# Patient Record
Sex: MALE | Race: BLACK OR AFRICAN AMERICAN | NOT HISPANIC OR LATINO | Employment: FULL TIME | URBAN - METROPOLITAN AREA
[De-identification: names, ages, dates, MRNs, and addresses within clinical notes are randomized per-mention and may not be internally consistent; named-entity substitution may affect disease eponyms.]

---

## 2020-01-20 ENCOUNTER — HOSPITAL ENCOUNTER (EMERGENCY)
Facility: CLINIC | Age: 55
Discharge: HOME | End: 2020-01-21
Attending: EMERGENCY MEDICINE | Admitting: EMERGENCY MEDICINE
Payer: COMMERCIAL

## 2020-01-20 VITALS
SYSTOLIC BLOOD PRESSURE: 136 MMHG | TEMPERATURE: 97.8 F | HEART RATE: 87 BPM | DIASTOLIC BLOOD PRESSURE: 85 MMHG | RESPIRATION RATE: 18 BRPM | WEIGHT: 260 LBS | OXYGEN SATURATION: 95 %

## 2020-01-20 DIAGNOSIS — H81.11 BENIGN PAROXYSMAL POSITIONAL VERTIGO OF RIGHT EAR: ICD-10-CM

## 2020-01-20 LAB
ANION GAP SERPL CALCULATED.3IONS-SCNC: 5 MMOL/L (ref 3–14)
BASOPHILS # BLD AUTO: 0 10E9/L (ref 0–0.2)
BASOPHILS NFR BLD AUTO: 0.1 %
BUN SERPL-MCNC: 28 MG/DL (ref 7–30)
CALCIUM SERPL-MCNC: 9.9 MG/DL (ref 8.5–10.1)
CHLORIDE SERPL-SCNC: 103 MMOL/L (ref 94–109)
CO2 SERPL-SCNC: 30 MMOL/L (ref 20–32)
CREAT SERPL-MCNC: 1.37 MG/DL (ref 0.66–1.25)
DIFFERENTIAL METHOD BLD: ABNORMAL
EOSINOPHIL # BLD AUTO: 0.1 10E9/L (ref 0–0.7)
EOSINOPHIL NFR BLD AUTO: 1.6 %
ERYTHROCYTE [DISTWIDTH] IN BLOOD BY AUTOMATED COUNT: 13.6 % (ref 10–15)
GFR SERPL CREATININE-BSD FRML MDRD: 57 ML/MIN/{1.73_M2}
GLUCOSE SERPL-MCNC: 138 MG/DL (ref 70–99)
HCT VFR BLD AUTO: 42.2 % (ref 40–53)
HGB BLD-MCNC: 13.8 G/DL (ref 13.3–17.7)
IMM GRANULOCYTES # BLD: 0 10E9/L (ref 0–0.4)
IMM GRANULOCYTES NFR BLD: 0.1 %
INTERPRETATION ECG - MUSE: NORMAL
LYMPHOCYTES # BLD AUTO: 0.5 10E9/L (ref 0.8–5.3)
LYMPHOCYTES NFR BLD AUTO: 5.9 %
MCH RBC QN AUTO: 28.2 PG (ref 26.5–33)
MCHC RBC AUTO-ENTMCNC: 32.7 G/DL (ref 31.5–36.5)
MCV RBC AUTO: 86 FL (ref 78–100)
MONOCYTES # BLD AUTO: 0.3 10E9/L (ref 0–1.3)
MONOCYTES NFR BLD AUTO: 3.5 %
NEUTROPHILS # BLD AUTO: 7.1 10E9/L (ref 1.6–8.3)
NEUTROPHILS NFR BLD AUTO: 88.8 %
NRBC # BLD AUTO: 0 10*3/UL
NRBC BLD AUTO-RTO: 0 /100
PLATELET # BLD AUTO: 312 10E9/L (ref 150–450)
POTASSIUM SERPL-SCNC: 4 MMOL/L (ref 3.4–5.3)
RBC # BLD AUTO: 4.9 10E12/L (ref 4.4–5.9)
SODIUM SERPL-SCNC: 138 MMOL/L (ref 133–144)
TROPONIN I SERPL-MCNC: <0.015 UG/L (ref 0–0.04)
WBC # BLD AUTO: 8 10E9/L (ref 4–11)

## 2020-01-20 PROCEDURE — 25800030 ZZH RX IP 258 OP 636: Performed by: EMERGENCY MEDICINE

## 2020-01-20 PROCEDURE — 96360 HYDRATION IV INFUSION INIT: CPT

## 2020-01-20 PROCEDURE — 99284 EMERGENCY DEPT VISIT MOD MDM: CPT | Mod: 25

## 2020-01-20 PROCEDURE — 80048 BASIC METABOLIC PNL TOTAL CA: CPT | Performed by: EMERGENCY MEDICINE

## 2020-01-20 PROCEDURE — 84484 ASSAY OF TROPONIN QUANT: CPT | Performed by: EMERGENCY MEDICINE

## 2020-01-20 PROCEDURE — 93005 ELECTROCARDIOGRAM TRACING: CPT

## 2020-01-20 PROCEDURE — 85025 COMPLETE CBC W/AUTO DIFF WBC: CPT | Performed by: EMERGENCY MEDICINE

## 2020-01-20 PROCEDURE — 25000132 ZZH RX MED GY IP 250 OP 250 PS 637: Performed by: EMERGENCY MEDICINE

## 2020-01-20 RX ORDER — SODIUM CHLORIDE 9 MG/ML
1000 INJECTION, SOLUTION INTRAVENOUS CONTINUOUS
Status: DISCONTINUED | OUTPATIENT
Start: 2020-01-20 | End: 2020-01-21 | Stop reason: HOSPADM

## 2020-01-20 RX ORDER — MECLIZINE HYDROCHLORIDE 25 MG/1
50 TABLET ORAL ONCE
Status: COMPLETED | OUTPATIENT
Start: 2020-01-20 | End: 2020-01-20

## 2020-01-20 RX ADMIN — MECLIZINE HYDROCHLORIDE 50 MG: 25 TABLET ORAL at 22:50

## 2020-01-20 RX ADMIN — SODIUM CHLORIDE 1000 ML: 9 INJECTION, SOLUTION INTRAVENOUS at 22:51

## 2020-01-20 ASSESSMENT — ENCOUNTER SYMPTOMS
NUMBNESS: 1
DIAPHORESIS: 1
HEADACHES: 1
DIARRHEA: 1
ABDOMINAL PAIN: 1
DIZZINESS: 1

## 2020-01-20 NOTE — ED AVS SNAPSHOT
Emergency Department  [address] [place] [address]  Phone:  [phone]  Fax:  [phone]                                    Giovanny Forbes Jr.   MRN: [id number]    Department:   Emergency Department   Date of Visit:  1/20/2020           After Visit Summary Signature Page    I have received my discharge instructions, and my questions have been answered. I have discussed any challenges I see with this plan with the nurse or doctor.    ..........................................................................................................................................  Patient/Patient Representative Signature      ..........................................................................................................................................  Patient Representative Print Name and Relationship to Patient    ..................................................               ................................................  Date                                   Time    ..........................................................................................................................................  Reviewed by Signature/Title    ...................................................              ..............................................  Date                                               Time          22EPIC Rev 08/18

## 2020-01-21 RX ORDER — MECLIZINE HYDROCHLORIDE 25 MG/1
25 TABLET ORAL EVERY 6 HOURS PRN
Qty: 15 TABLET | Refills: 0 | Status: SHIPPED | OUTPATIENT
Start: 2020-01-21 | End: 2023-06-07

## 2020-01-21 RX ORDER — ONDANSETRON 4 MG/1
4 TABLET, ORALLY DISINTEGRATING ORAL EVERY 8 HOURS PRN
Qty: 10 TABLET | Refills: 0 | Status: SHIPPED | OUTPATIENT
Start: 2020-01-21 | End: 2020-01-24

## 2020-01-21 NOTE — ED TRIAGE NOTES
Pt reports dizziness since yesterday but more today, noticed numbness/tingling in left hand pointer and thumb. Pt states pain in left shoulder and under left rib cage.

## 2020-01-21 NOTE — ED NOTES
"Pt tolerated road test well.  Hustle steady, denies dizziness.  Does report feeling a little lightheaded, but states that he \"feels good.\"  MD notified.  Will continue to monitor.  "

## 2020-01-21 NOTE — ED PROVIDER NOTES
History     Chief Complaint:  Dizziness       HPI   Giovanny Forbes is a 54 year old male with a history of hypertension, hyperlipidemia, obesity, and palpitations, who presents for evaluation of room spinning dizziness beginning this evening. Patient reports feeling diaphoretic and dizzy when walking up the stairs this evening, so he went and sat in his bathroom. At that time, he passed a watery stool, but continued to feel dizzy and diaphoretic. He has noted difficulty walking up the stairs or moving around due to the dizziness. Patient's symptoms are worse on the right side. He says he has decreased room spinning dizziness when keeping his eyes closed. Additionally, he reports of tinging in the fingertips of his left hand, left bicep, and on both sides of his head. Here, he continues to endorse dizziness and abdominal discomfort, as well as a pounding sensation in the left side of his head. Of note, patient also reports of a cough and congestion for the past week for which he has been managing with Emergen-C. He has been able to consume water and juice since onset. Denies chest pain or any other concerns. Patient does remark of family history of sudden cardiac death.     Allergies:  NKDA     Medications:    Aspirin 81 mg  Lipitor  Hydrochlorothiazide     Past Medical History:    HTN  Obesity  HLD  Palpitations     Past Surgical History:    Colonoscopy   Hernia repair     Family History:    HTN  Asthma  DM   Prostate cancer  Stroke     Social History:  Smoking status: never  Alcohol use: no   PCP: Lorne Ferrer     Review of Systems   Constitutional: Positive for diaphoresis.   Cardiovascular: Negative for chest pain.   Gastrointestinal: Positive for abdominal pain and diarrhea.   Neurological: Positive for dizziness, numbness (tingling) and headaches.   All other systems reviewed and are negative.        Physical Exam     Patient Vitals for the past 24 hrs:   BP Temp Temp src Pulse Heart Rate Resp SpO2  Weight   01/20/20 2340 136/85 -- -- 87 89 18 95 % --   01/20/20 2121 132/79 97.8  F (36.6  C) Temporal 99 99 20 95 % 117.9 kg (260 lb)        Physical Exam  General: Appears well-developed and well-nourished.   Head: No signs of trauma.   Mouth/Throat: Oropharynx is clear and moist.   Ears:  TMs clear bilaterally  Eyes: Conjunctivae are normal. Pupils are equal, round, and reactive to light.   Neck: Normal range of motion. No nuchal rigidity. No cervical adenopathy  CV: Normal rate and regular rhythm.    Resp: Effort normal and breath sounds normal. No respiratory distress.   GI: Soft. There is no tenderness.  No rebound or guarding.  Normal bowel sounds.    MSK: Normal range of motion. no edema. No Calf tenderness.  Neuro: The patient is alert and oriented to person, place, and time.  PERRLA, EOMI, strength in upper/lower extremities normal and symmetrical. Sensation normal. Speech normal.  CN2-12 intact.  No cerebellar deficits.  Steady gait.  +symptoms with Bushra-Hallpike with right side down.  GCS 15  Skin: Skin is warm and dry. No rash noted.   Psych: normal mood and affect. behavior is normal.       Emergency Department Course     ECG (21:25:03):  Rate 100 bpm. NY interval 168. QRS duration 100. QT/QTc 334/430. P-R-T axes 61 28 -6. Normal sinus rhythm. Minimal criteria for LVH, may be normal variant. Cannot rule out Anteroseptal infarct, age undetermined. Abnormal ECG. Agree with computer interpretation.  Interpreted at 2205 by Paulino Boone MD.     Laboratory:  Troponin 1 (2209): <0.015  CBC: WBC 8.0, HGB 13.8,    BMP: Glucose 138 (H), creatinine 1.37 (H), o/w WNL      Interventions:  2250: meclizine 50 mg PO  2251: NS 1L IV Bolus     Emergency Department Course:  2206: Nursing notes and vitals reviewed. I performed an exam of the patient as documented above.     IV inserted. Medicine administered as documented above. Blood drawn. This was sent to the lab for further testing, results  above.    2350: I rechecked the patient and discussed the results of his workup thus far.     Findings and plan explained to the Patient. Patient discharged home with instructions regarding supportive care, medications, and reasons to return. The importance of close follow-up was reviewed. The Patient was prescribed meclizine and Zofran.     I personally reviewed the laboratory results with the Patient and answered all related questions prior to discharge.         Impression & Plan      Medical Decision Making:  Giovanny Forbes Jr. is a 54-year-old gentleman who presents due to vertigo type symptoms and not feeling well.  Symptoms started after he had gotten up to go upstairs.  He does describe symptoms improving when he closes his eyes or holds his head still and worse when he is moving them.  On my evaluation, he did have a positive Bushra-Hallpike with the right side down.  He was otherwise fully neurologically intact.  He was given meclizine and IV fluids and following this he did have a steady gait and clinically I have low suspicion for cerebellar pathology given the otherwise reassuring exam and history consistent with BPPV.  EKG and blood work and troponin were obtained and troponin was undetectable and pt is not having any chest pain.  Given the improved symptoms, I feel the patient is appropriate for discharge home and outpatient management.  Recommended to follow close with his primary care doctor and instructed to return if he has any worsening symptoms or further concerns.      Diagnosis:    ICD-10-CM    1. Benign paroxysmal positional vertigo of right ear H81.11        Disposition:  discharged to home    Discharge Medications:  New Prescriptions    MECLIZINE (ANTIVERT) 25 MG TABLET    Take 1 tablet (25 mg) by mouth every 6 hours as needed for dizziness    ONDANSETRON (ZOFRAN ODT) 4 MG ODT TAB    Take 1 tablet (4 mg) by mouth every 8 hours as needed for nausea     Marisela MUHAMMAD, am serving as a scribe at  10:06 PM on 1/20/2020 to document services personally performed by Paulino Boone MD based on my observations and the provider's statements to me.    Marisela Javier  1/20/2020    EMERGENCY DEPARTMENT       Paulino Boone MD  01/21/20 0511

## 2023-06-07 ENCOUNTER — OFFICE VISIT (OUTPATIENT)
Dept: INTERNAL MEDICINE | Facility: CLINIC | Age: 58
End: 2023-06-07
Payer: COMMERCIAL

## 2023-06-07 VITALS
RESPIRATION RATE: 18 BRPM | BODY MASS INDEX: 32.48 KG/M2 | TEMPERATURE: 98.6 F | WEIGHT: 253.1 LBS | DIASTOLIC BLOOD PRESSURE: 91 MMHG | OXYGEN SATURATION: 97 % | SYSTOLIC BLOOD PRESSURE: 152 MMHG | HEIGHT: 74 IN | HEART RATE: 70 BPM

## 2023-06-07 DIAGNOSIS — Z00.00 PHYSICAL EXAM, ANNUAL: Primary | ICD-10-CM

## 2023-06-07 DIAGNOSIS — Z12.11 SCREEN FOR COLON CANCER: ICD-10-CM

## 2023-06-07 DIAGNOSIS — I10 PRIMARY HYPERTENSION: ICD-10-CM

## 2023-06-07 DIAGNOSIS — N52.9 ERECTILE DYSFUNCTION, UNSPECIFIED ERECTILE DYSFUNCTION TYPE: ICD-10-CM

## 2023-06-07 DIAGNOSIS — Z13.220 LIPID SCREENING: ICD-10-CM

## 2023-06-07 DIAGNOSIS — Z12.5 SCREENING FOR PROSTATE CANCER: ICD-10-CM

## 2023-06-07 PROCEDURE — 90750 HZV VACC RECOMBINANT IM: CPT | Performed by: INTERNAL MEDICINE

## 2023-06-07 PROCEDURE — 99213 OFFICE O/P EST LOW 20 MIN: CPT | Mod: 25 | Performed by: INTERNAL MEDICINE

## 2023-06-07 PROCEDURE — 99396 PREV VISIT EST AGE 40-64: CPT | Mod: 25 | Performed by: INTERNAL MEDICINE

## 2023-06-07 PROCEDURE — 90471 IMMUNIZATION ADMIN: CPT | Performed by: INTERNAL MEDICINE

## 2023-06-07 RX ORDER — SILDENAFIL 100 MG/1
100 TABLET, FILM COATED ORAL DAILY PRN
Qty: 30 TABLET | Refills: 3 | Status: SHIPPED | OUTPATIENT
Start: 2023-06-07 | End: 2023-06-07

## 2023-06-07 RX ORDER — HYDROCHLOROTHIAZIDE 25 MG/1
25 TABLET ORAL DAILY
Qty: 90 TABLET | Refills: 3 | Status: SHIPPED | OUTPATIENT
Start: 2023-06-07 | End: 2024-06-10

## 2023-06-07 RX ORDER — SILDENAFIL 100 MG/1
100 TABLET, FILM COATED ORAL DAILY PRN
Qty: 30 TABLET | Refills: 3 | Status: SHIPPED | OUTPATIENT
Start: 2023-06-07 | End: 2024-04-30

## 2023-06-07 ASSESSMENT — ENCOUNTER SYMPTOMS
MYALGIAS: 0
WEAKNESS: 0
ARTHRALGIAS: 0
ABDOMINAL PAIN: 0
COUGH: 0
FREQUENCY: 0
HEADACHES: 0
HEMATOCHEZIA: 0
HEARTBURN: 0
DIZZINESS: 0
PALPITATIONS: 0
SORE THROAT: 0
NAUSEA: 0
JOINT SWELLING: 0
HEMATURIA: 0
DIARRHEA: 0
FEVER: 0
NERVOUS/ANXIOUS: 0
DYSURIA: 0
CHILLS: 0
PARESTHESIAS: 0
CONSTIPATION: 0
EYE PAIN: 0
SHORTNESS OF BREATH: 0

## 2023-06-07 NOTE — PROGRESS NOTES
SUBJECTIVE:   CC: Giovanny is an 57 year old who presents for preventative health visit.       6/7/2023     9:42 AM   Additional Questions   Roomed by harjeet   Accompanied by lemuel         6/7/2023     9:42 AM   Patient Reported Additional Medications   Patient reports taking the following new medications none     Healthy Habits:     Getting at least 3 servings of Calcium per day:  Yes    Bi-annual eye exam:  Yes    Dental care twice a year:  Yes    Sleep apnea or symptoms of sleep apnea:  None    Diet:  Other    Frequency of exercise:  2-3 days/week    Duration of exercise:  Greater than 60 minutes    Taking medications regularly:  Yes    Medication side effects:  Not applicable    PHQ-2 Total Score: 0    Additional concerns today:  No                  Today's PHQ-2 Score:       6/7/2023     9:47 AM   PHQ-2 ( 1999 [employer])   Q1: Little interest or pleasure in doing things 0   Q2: Feeling down, depressed or hopeless 0   PHQ-2 Score 0   Q1: Little interest or pleasure in doing things Not at all   Q2: Feeling down, depressed or hopeless Not at all   PHQ-2 Score 0       Have you ever done Advance Care Planning? (For example, a Health Directive, POLST, or a discussion with a medical provider or your loved ones about your wishes): Yes, advance care planning is on file.    Social History     Tobacco Use     Smoking status: Never     Smokeless tobacco: Never   Vaping Use     Vaping status: Not on file   Substance Use Topics     Alcohol use: Not on file             6/7/2023     9:47 AM   Alcohol Use   Prescreen: >3 drinks/day or >7 drinks/week? No       Last PSA: No results found for: PSA    Reviewed orders with patient. Reviewed health maintenance and updated orders accordingly - Yes      Reviewed and updated as needed this visit by clinical staff   Tobacco  Allergies  Meds              Reviewed and updated as needed this visit by Provider                     Review of Systems   Constitutional: Negative for chills and  fever.   HENT: Negative for congestion, ear pain, hearing loss and sore throat.    Eyes: Negative for pain and visual disturbance.   Respiratory: Negative for cough and shortness of breath.    Cardiovascular: Negative for chest pain, palpitations and peripheral edema.   Gastrointestinal: Negative for abdominal pain, constipation, diarrhea, heartburn, hematochezia and nausea.   Genitourinary: Positive for impotence. Negative for dysuria, frequency, genital sores, hematuria, penile discharge and urgency.   Musculoskeletal: Negative for arthralgias, joint swelling and myalgias.   Skin: Negative for rash.   Neurological: Negative for dizziness, weakness, headaches and paresthesias.   Psychiatric/Behavioral: Negative for mood changes. The patient is not nervous/anxious.          OBJECTIVE:   There were no vitals taken for this visit.    Physical Exam  GENERAL: healthy, alert and no distress  EYES: Eyes grossly normal to inspection, PERRL and conjunctivae and sclerae normal  HENT: ear canals and TM's normal, nose and mouth without ulcers or lesions  NECK: no adenopathy, no asymmetry, masses, or scars and thyroid normal to palpation  RESP: lungs clear to auscultation - no rales, rhonchi or wheezes  CV: regular rate and rhythm, normal S1 S2, no S3 or S4, no murmur, click or rub, no peripheral edema and peripheral pulses strong  ABDOMEN: soft, nontender, no hepatosplenomegaly, no masses and bowel sounds normal  MS: no gross musculoskeletal defects noted, no edema  SKIN: no suspicious lesions or rashes  NEURO: Normal strength and tone, mentation intact and speech normal  PSYCH: mentation appears normal, affect normal/bright    Diagnostic Test Results:  Labs reviewed in Epic    ASSESSMENT/PLAN:   (Z00.00) Physical exam, annual  (primary encounter diagnosis)  Comment: normal  Plan:  items reviewed    (I10) Primary hypertension  Comment: off his medication  Plan: Comprehensive metabolic panel,         hydrochlorothiazide  (HYDRODIURIL) 25 MG tablet        Restart hydrochlorothiazide. Nurse visit in 4 weeks. Labs at that time as well.    (N52.9) Erectile dysfunction, unspecified erectile dysfunction type  Comment: ongoing  Plan: sildenafil (VIAGRA) 100 MG tablet,         DISCONTINUED: sildenafil (VIAGRA) 100 MG tablet        Interested in viagra trial.      (Z12.11) Screen for colon cancer  Comment: due  Plan: Colonoscopy Screening  Referral        Colonoscopy ordered. Discussed.    (Z12.5) Screening for prostate cancer  Comment: would like to continue screening  Plan: PSA, screen        ordered    Patient has been advised of split billing requirements and indicates understanding: Yes      COUNSELING:   Reviewed preventive health counseling, as reflected in patient instructions        He reports that he has never smoked. He has never used smokeless tobacco.            Lorne Ferrer MD  [facility]

## 2024-04-30 ENCOUNTER — NURSE TRIAGE (OUTPATIENT)
Dept: NURSING | Facility: CLINIC | Age: 59
End: 2024-04-30

## 2024-04-30 ENCOUNTER — OFFICE VISIT (OUTPATIENT)
Dept: FAMILY MEDICINE | Facility: CLINIC | Age: 59
End: 2024-04-30
Payer: COMMERCIAL

## 2024-04-30 VITALS
BODY MASS INDEX: 33.3 KG/M2 | TEMPERATURE: 98.4 F | SYSTOLIC BLOOD PRESSURE: 138 MMHG | HEIGHT: 75 IN | HEART RATE: 75 BPM | RESPIRATION RATE: 16 BRPM | DIASTOLIC BLOOD PRESSURE: 76 MMHG | OXYGEN SATURATION: 96 % | WEIGHT: 267.8 LBS

## 2024-04-30 DIAGNOSIS — R19.7 DIARRHEA, UNSPECIFIED TYPE: Primary | ICD-10-CM

## 2024-04-30 PROBLEM — E78.5 HYPERLIPIDEMIA, UNSPECIFIED: Status: ACTIVE | Noted: 2017-02-10

## 2024-04-30 PROBLEM — E66.811 OBESITY (BMI 30.0-34.9): Status: ACTIVE | Noted: 2019-03-04

## 2024-04-30 PROBLEM — I10 ESSENTIAL HYPERTENSION: Status: ACTIVE | Noted: 2019-04-01

## 2024-04-30 LAB
ADV 40+41 DNA STL QL NAA+NON-PROBE: NEGATIVE
ASTRO TYP 1-8 RNA STL QL NAA+NON-PROBE: NEGATIVE
BASOPHILS # BLD AUTO: 0 10E3/UL (ref 0–0.2)
BASOPHILS NFR BLD AUTO: 0 %
C CAYETANENSIS DNA STL QL NAA+NON-PROBE: NEGATIVE
CAMPYLOBACTER DNA SPEC NAA+PROBE: NEGATIVE
CRYPTOSP DNA STL QL NAA+NON-PROBE: NEGATIVE
E COLI O157 DNA STL QL NAA+NON-PROBE: NORMAL
E HISTOLYT DNA STL QL NAA+NON-PROBE: NEGATIVE
EAEC ASTA GENE ISLT QL NAA+PROBE: NEGATIVE
EC STX1+STX2 GENES STL QL NAA+NON-PROBE: NEGATIVE
EOSINOPHIL # BLD AUTO: 0.2 10E3/UL (ref 0–0.7)
EOSINOPHIL NFR BLD AUTO: 4 %
EPEC EAE GENE STL QL NAA+NON-PROBE: NEGATIVE
ERYTHROCYTE [DISTWIDTH] IN BLOOD BY AUTOMATED COUNT: 13.6 % (ref 10–15)
ETEC LTA+ST1A+ST1B TOX ST NAA+NON-PROBE: NEGATIVE
G LAMBLIA DNA STL QL NAA+NON-PROBE: NEGATIVE
HCT VFR BLD AUTO: 39.7 % (ref 40–53)
HGB BLD-MCNC: 12.8 G/DL (ref 13.3–17.7)
IMM GRANULOCYTES # BLD: 0 10E3/UL
IMM GRANULOCYTES NFR BLD: 0 %
LYMPHOCYTES # BLD AUTO: 1.3 10E3/UL (ref 0.8–5.3)
LYMPHOCYTES NFR BLD AUTO: 25 %
MCH RBC QN AUTO: 27.5 PG (ref 26.5–33)
MCHC RBC AUTO-ENTMCNC: 32.2 G/DL (ref 31.5–36.5)
MCV RBC AUTO: 85 FL (ref 78–100)
MONOCYTES # BLD AUTO: 0.3 10E3/UL (ref 0–1.3)
MONOCYTES NFR BLD AUTO: 6 %
NEUTROPHILS # BLD AUTO: 3.4 10E3/UL (ref 1.6–8.3)
NEUTROPHILS NFR BLD AUTO: 65 %
NOROVIRUS GI+II RNA STL QL NAA+NON-PROBE: NEGATIVE
P SHIGELLOIDES DNA STL QL NAA+NON-PROBE: NEGATIVE
PLATELET # BLD AUTO: 305 10E3/UL (ref 150–450)
RBC # BLD AUTO: 4.65 10E6/UL (ref 4.4–5.9)
RVA RNA STL QL NAA+NON-PROBE: NEGATIVE
SALMONELLA SP RPOD STL QL NAA+PROBE: NEGATIVE
SAPO I+II+IV+V RNA STL QL NAA+NON-PROBE: NEGATIVE
SHIGELLA SP+EIEC IPAH ST NAA+NON-PROBE: NEGATIVE
V CHOLERAE DNA SPEC QL NAA+PROBE: NEGATIVE
VIBRIO DNA SPEC NAA+PROBE: NEGATIVE
WBC # BLD AUTO: 5.3 10E3/UL (ref 4–11)
Y ENTEROCOL DNA STL QL NAA+PROBE: NEGATIVE

## 2024-04-30 PROCEDURE — 85025 COMPLETE CBC W/AUTO DIFF WBC: CPT | Performed by: NURSE PRACTITIONER

## 2024-04-30 PROCEDURE — 99213 OFFICE O/P EST LOW 20 MIN: CPT | Performed by: NURSE PRACTITIONER

## 2024-04-30 PROCEDURE — 36415 COLL VENOUS BLD VENIPUNCTURE: CPT | Performed by: NURSE PRACTITIONER

## 2024-04-30 PROCEDURE — 80053 COMPREHEN METABOLIC PANEL: CPT | Performed by: NURSE PRACTITIONER

## 2024-04-30 PROCEDURE — 87507 IADNA-DNA/RNA PROBE TQ 12-25: CPT | Performed by: NURSE PRACTITIONER

## 2024-04-30 ASSESSMENT — PAIN SCALES - GENERAL: PAINLEVEL: NO PAIN (0)

## 2024-04-30 NOTE — PROGRESS NOTES
"    Vikram Baltazar is a 58 year old, presenting for the following health issues:  Gastrointestinal Problem (Ate at [employer] and having Diarrhea for 2 days, using [place] Caps, Pepto Bismol.)      4/30/2024    10:30 AM   Additional Questions   Roomed by LION GEORGES     History of Present Illness       Reason for visit:  Loose Stools  Symptom onset:  1-3 days ago  Symptoms include:  Diarrhea  Symptom intensity:  Moderate  Symptom progression:  Staying the same  Had these symptoms before:  No  What makes it worse:  N/a  What makes it better:  Sleep    He eats 0-1 servings of fruits and vegetables daily.He consumes 1 sweetened beverage(s) daily.He exercises with enough effort to increase his heart rate 60 or more minutes per day.  He exercises with enough effort to increase his heart rate 3 or less days per week.        Diarrhea  Onset/Duration:  x 3 days  Description:       Consistency of stool: runny       Blood in stool: No       Number of loose stools past 24 hours: 6-8  Progression of Symptoms: same  Accompanying signs and symptoms:       Fever: No       Nausea/Vomiting: No       Abdominal pain: No       Weight loss: No       Episodes of constipation: No  History   Ill contacts: No  Recent use of antibiotics: No  Recent travels: No  Recent medication-new or changes(Rx or OTC): No  Precipitating or alleviating factors: [facility]   Therapies tried and outcome: OTC supplements, Pepto Bismol     Review of Systems  Constitutional, neuro, ENT, endocrine, pulmonary, cardiac, gastrointestinal, genitourinary, musculoskeletal, integument and psychiatric systems are negative, except as otherwise noted.      Objective    /76 (BP Location: Left arm, Patient Position: Sitting, Cuff Size: Adult Regular)   Pulse 75   Temp 98.4  F (36.9  C) (Oral)   Resp 16   Ht 1.892 m (6' 2.5\")   Wt 121.5 kg (267 lb 12.8 oz)   SpO2 96%   BMI 33.92 kg/m    Body mass index is 33.92 kg/m .  Physical Exam   GENERAL: alert and no " distress  RESP: lungs clear to auscultation - no rales, rhonchi or wheezes  CV: regular rate and rhythm, normal S1 S2, no S3 or S4, no murmur, click or rub, no peripheral edema   ABDOMEN: soft, nontender, no hepatosplenomegaly, no masses and bowel sounds normal  MS: no gross musculoskeletal defects noted, no edema  NEURO: Normal strength and tone, mentation intact and speech normal  PSYCH: mentation appears normal, affect normal/bright    A/P:  1. Diarrhea, unspecified type  - Enteric Bacteria and Virus Panel by MARTIN Stool; Future  - CBC with platelets and differential; Future  - Comprehensive metabolic panel (BMP + Alb, Alk Phos, ALT, AST, Total. Bili, TP); Future  - Enteric Bacteria and Virus Panel by MARTIN Stool  - CBC with platelets and differential  - Comprehensive metabolic panel (BMP + Alb, Alk Phos, ALT, AST, Total. Bili, TP)          Signed Electronically by: Kayli Hamilton, CNP

## 2024-04-30 NOTE — TELEPHONE ENCOUNTER
Triage call  Patient calling to repot that he ate at [employer] on Sunday and he has not been the same since.  He has been having diarrhea about every 2 hours .  He did call the health department.  He has been taking Pepto bismol charcoal tablets .  He has not had a have not had solid stool since Sunday.  He denies any abdominal pain but says it feels heavy.    Per protocol see in office today.  Care advice given.  Verbalizes understanding and agrees with plan.  Transferred to scheduling for a appointment    Aemlia Garcia RN   [facility] Nurse Advisor  8:32 AM 4/30/2024    Reason for Disposition   SEVERE diarrhea (e.g., 7 or more times / day more than normal) and present > 24 hours (1 day)    Additional Information   Negative: Shock suspected (e.g., cold/pale/clammy skin, too weak to stand, low BP, rapid pulse)   Negative: Difficult to awaken or acting confused (e.g., disoriented, slurred speech)   Negative: Sounds like a life-threatening emergency to the triager   Negative: Vomiting also present and worse than the diarrhea   Negative: Blood in stool and without diarrhea   Negative: SEVERE abdominal pain (e.g., excruciating) and present > 1 hour   Negative: SEVERE abdominal pain and age > 60 years   Negative: Bloody, black, or tarry bowel movements  (Exception: Chronic-unchanged black-grey bowel movements and is taking iron pills or Pepto-Bismol.)   Negative: SEVERE diarrhea (e.g., 7 or more times / day more than normal) and age > 60 years   Negative: Constant abdominal pain lasting > 2 hours   Negative: Drinking very little and dehydration suspected (e.g., no urine > 12 hours, very dry mouth, very lightheaded)   Negative: Patient sounds very sick or weak to the triager    Protocols used: Diarrhea-A-OH

## 2024-05-01 ENCOUNTER — TELEPHONE (OUTPATIENT)
Dept: FAMILY MEDICINE | Facility: CLINIC | Age: 59
End: 2024-05-01
Payer: COMMERCIAL

## 2024-05-01 LAB
ALBUMIN SERPL BCG-MCNC: 4.3 G/DL (ref 3.5–5.2)
ALP SERPL-CCNC: 74 U/L (ref 40–150)
ALT SERPL W P-5'-P-CCNC: 20 U/L (ref 0–70)
ANION GAP SERPL CALCULATED.3IONS-SCNC: 11 MMOL/L (ref 7–15)
AST SERPL W P-5'-P-CCNC: 22 U/L (ref 0–45)
BILIRUB SERPL-MCNC: 0.5 MG/DL
BUN SERPL-MCNC: 13.1 MG/DL (ref 8–23)
CALCIUM SERPL-MCNC: 9.6 MG/DL (ref 8.8–10.2)
CHLORIDE SERPL-SCNC: 104 MMOL/L (ref 98–107)
CREAT SERPL-MCNC: 1.14 MG/DL (ref 0.67–1.17)
DEPRECATED HCO3 PLAS-SCNC: 19 MMOL/L (ref 22–29)
EGFRCR SERPLBLD CKD-EPI 2021: 74 ML/MIN/1.73M2
GLUCOSE SERPL-MCNC: 85 MG/DL (ref 70–99)
POTASSIUM SERPL-SCNC: 4.3 MMOL/L (ref 3.4–5.3)
PROT SERPL-MCNC: 7.6 G/DL (ref 6.4–8.3)
SODIUM SERPL-SCNC: 134 MMOL/L (ref 135–145)

## 2024-05-01 NOTE — TELEPHONE ENCOUNTER
Spoke with patient and relayed provider message. Patient verbalized understanding and is in agreement with plan.     Anna Jenkins RN  [facility]

## 2024-05-01 NOTE — TELEPHONE ENCOUNTER
Spoke with Patient, he was given the message below from Provider. Pt voiced he understood.  Divine Solares on 5/1/2024 at 10:40 AM

## 2024-05-01 NOTE — TELEPHONE ENCOUNTER
----- Message from Kayli Hamilton CNP sent at 5/1/2024 10:33 AM CDT -----  Please call patient- hemoglobin is a little low (anemic)- take a daily multivitamin then recheck in a few months.  Sodium is a little low (from diarrhea)- recommend taking sports drink with electrolytes then recheck in about 1 month.  Follow-up with PCP for further management.

## 2024-06-03 ENCOUNTER — HOSPITAL ENCOUNTER (EMERGENCY)
Facility: CLINIC | Age: 59
Discharge: ANOTHER HOSPITAL | End: 2024-06-03
Attending: FAMILY MEDICINE | Admitting: FAMILY MEDICINE
Payer: COMMERCIAL

## 2024-06-03 ENCOUNTER — ANESTHESIA EVENT (OUTPATIENT)
Dept: EMERGENCY MEDICINE | Facility: CLINIC | Age: 59
End: 2024-06-03
Payer: COMMERCIAL

## 2024-06-03 ENCOUNTER — ANESTHESIA (OUTPATIENT)
Dept: EMERGENCY MEDICINE | Facility: CLINIC | Age: 59
End: 2024-06-03
Payer: COMMERCIAL

## 2024-06-03 ENCOUNTER — APPOINTMENT (OUTPATIENT)
Dept: RADIOLOGY | Facility: CLINIC | Age: 59
End: 2024-06-03
Attending: PHYSICIAN ASSISTANT
Payer: COMMERCIAL

## 2024-06-03 ENCOUNTER — APPOINTMENT (OUTPATIENT)
Dept: CT IMAGING | Facility: CLINIC | Age: 59
End: 2024-06-03
Attending: PHYSICIAN ASSISTANT
Payer: COMMERCIAL

## 2024-06-03 ENCOUNTER — HOSPITAL ENCOUNTER (INPATIENT)
Facility: CLINIC | Age: 59
LOS: 2 days | Discharge: HOME | DRG: 143 | End: 2024-06-06
Attending: EMERGENCY MEDICINE | Admitting: DENTIST
Payer: COMMERCIAL

## 2024-06-03 VITALS
RESPIRATION RATE: 30 BRPM | BODY MASS INDEX: 31.08 KG/M2 | DIASTOLIC BLOOD PRESSURE: 86 MMHG | TEMPERATURE: 97.3 F | OXYGEN SATURATION: 100 % | HEART RATE: 107 BPM | WEIGHT: 250 LBS | SYSTOLIC BLOOD PRESSURE: 173 MMHG | HEIGHT: 75 IN

## 2024-06-03 DIAGNOSIS — T88.4XXA HARD TO INTUBATE, INITIAL ENCOUNTER: ICD-10-CM

## 2024-06-03 DIAGNOSIS — M79.89 NECROTIZING SOFT TISSUE INFECTION: Primary | ICD-10-CM

## 2024-06-03 DIAGNOSIS — M79.89 NECROTIZING SOFT TISSUE INFECTION: ICD-10-CM

## 2024-06-03 DIAGNOSIS — R22.0 FACIAL SWELLING: ICD-10-CM

## 2024-06-03 LAB
ANION GAP SERPL CALCULATED.3IONS-SCNC: 11 MMOL/L (ref 7–15)
BASE EXCESS BLDV CALC-SCNC: 3.9 MMOL/L (ref -3–3)
BASOPHILS # BLD AUTO: 0 10E3/UL (ref 0–0.2)
BASOPHILS NFR BLD AUTO: 0 %
BUN SERPL-MCNC: 12.9 MG/DL (ref 8–23)
CALCIUM SERPL-MCNC: 10.2 MG/DL (ref 8.8–10.2)
CHLORIDE SERPL-SCNC: 99 MMOL/L (ref 98–107)
CREAT SERPL-MCNC: 1.29 MG/DL (ref 0.67–1.17)
CRP SERPL-MCNC: 285 MG/L
DEPRECATED HCO3 PLAS-SCNC: 25 MMOL/L (ref 22–29)
EGFRCR SERPLBLD CKD-EPI 2021: 63 ML/MIN/1.73M2
EOSINOPHIL # BLD AUTO: 0 10E3/UL (ref 0–0.7)
EOSINOPHIL NFR BLD AUTO: 0 %
ERYTHROCYTE [DISTWIDTH] IN BLOOD BY AUTOMATED COUNT: 14.1 % (ref 10–15)
GLUCOSE SERPL-MCNC: 122 MG/DL (ref 70–99)
HCO3 BLDV-SCNC: 28 MMOL/L (ref 21–28)
HCT VFR BLD AUTO: 37.7 % (ref 40–53)
HGB BLD-MCNC: 12.4 G/DL (ref 13.3–17.7)
IMM GRANULOCYTES # BLD: 0.4 10E3/UL
IMM GRANULOCYTES NFR BLD: 2 %
LACTATE SERPL-SCNC: 1.3 MMOL/L (ref 0.7–2)
LYMPHOCYTES # BLD AUTO: 0.9 10E3/UL (ref 0.8–5.3)
LYMPHOCYTES NFR BLD AUTO: 5 %
MCH RBC QN AUTO: 27.7 PG (ref 26.5–33)
MCHC RBC AUTO-ENTMCNC: 32.9 G/DL (ref 31.5–36.5)
MCV RBC AUTO: 84 FL (ref 78–100)
MONOCYTES # BLD AUTO: 1.5 10E3/UL (ref 0–1.3)
MONOCYTES NFR BLD AUTO: 8 %
NEUTROPHILS # BLD AUTO: 15.8 10E3/UL (ref 1.6–8.3)
NEUTROPHILS NFR BLD AUTO: 85 %
NRBC # BLD AUTO: 0 10E3/UL
NRBC BLD AUTO-RTO: 0 /100
O2/TOTAL GAS SETTING VFR VENT: 21 %
OXYHGB MFR BLDV: 78 % (ref 70–75)
PCO2 BLDV: 42 MM HG (ref 40–50)
PH BLDV: 7.44 [PH] (ref 7.32–7.43)
PLATELET # BLD AUTO: 271 10E3/UL (ref 150–450)
PO2 BLDV: 43 MM HG (ref 25–47)
POTASSIUM SERPL-SCNC: 3.9 MMOL/L (ref 3.4–5.3)
RADIOLOGIST FLAGS: ABNORMAL
RBC # BLD AUTO: 4.48 10E6/UL (ref 4.4–5.9)
SAO2 % BLDV: 79.3 % (ref 70–75)
SODIUM SERPL-SCNC: 135 MMOL/L (ref 135–145)
WBC # BLD AUTO: 18.6 10E3/UL (ref 4–11)

## 2024-06-03 PROCEDURE — 36415 COLL VENOUS BLD VENIPUNCTURE: CPT | Performed by: EMERGENCY MEDICINE

## 2024-06-03 PROCEDURE — 94002 VENT MGMT INPAT INIT DAY: CPT

## 2024-06-03 PROCEDURE — 258N000003 HC RX IP 258 OP 636: Performed by: FAMILY MEDICINE

## 2024-06-03 PROCEDURE — 250N000011 HC RX IP 250 OP 636: Performed by: EMERGENCY MEDICINE

## 2024-06-03 PROCEDURE — 70491 CT SOFT TISSUE NECK W/DYE: CPT

## 2024-06-03 PROCEDURE — 96367 TX/PROPH/DG ADDL SEQ IV INF: CPT | Performed by: EMERGENCY MEDICINE

## 2024-06-03 PROCEDURE — 85004 AUTOMATED DIFF WBC COUNT: CPT | Performed by: PHYSICIAN ASSISTANT

## 2024-06-03 PROCEDURE — 250N000011 HC RX IP 250 OP 636: Performed by: FAMILY MEDICINE

## 2024-06-03 PROCEDURE — 96367 TX/PROPH/DG ADDL SEQ IV INF: CPT | Mod: 59

## 2024-06-03 PROCEDURE — 31500 INSERT EMERGENCY AIRWAY: CPT

## 2024-06-03 PROCEDURE — 93005 ELECTROCARDIOGRAM TRACING: CPT | Performed by: FAMILY MEDICINE

## 2024-06-03 PROCEDURE — 80048 BASIC METABOLIC PNL TOTAL CA: CPT | Performed by: PHYSICIAN ASSISTANT

## 2024-06-03 PROCEDURE — 999N000127 HC STATISTIC PERIPHERAL IV START W US GUIDANCE

## 2024-06-03 PROCEDURE — 99292 CRITICAL CARE ADDL 30 MIN: CPT

## 2024-06-03 PROCEDURE — 36415 COLL VENOUS BLD VENIPUNCTURE: CPT | Performed by: PHYSICIAN ASSISTANT

## 2024-06-03 PROCEDURE — 86901 BLOOD TYPING SEROLOGIC RH(D): CPT | Performed by: EMERGENCY MEDICINE

## 2024-06-03 PROCEDURE — 96365 THER/PROPH/DIAG IV INF INIT: CPT | Mod: 59

## 2024-06-03 PROCEDURE — 250N000011 HC RX IP 250 OP 636: Performed by: PHYSICIAN ASSISTANT

## 2024-06-03 PROCEDURE — 258N000003 HC RX IP 258 OP 636: Performed by: EMERGENCY MEDICINE

## 2024-06-03 PROCEDURE — 96375 TX/PRO/DX INJ NEW DRUG ADDON: CPT | Mod: 59

## 2024-06-03 PROCEDURE — 999N000158 HC STATISTIC RCP TIME ED VENT EA 10 MIN

## 2024-06-03 PROCEDURE — 250N000009 HC RX 250: Performed by: NURSE ANESTHETIST, CERTIFIED REGISTERED

## 2024-06-03 PROCEDURE — 99291 CRITICAL CARE FIRST HOUR: CPT | Mod: 25 | Performed by: EMERGENCY MEDICINE

## 2024-06-03 PROCEDURE — 370N000003 HC ANESTHESIA WARD SERVICE: Performed by: ANESTHESIOLOGY

## 2024-06-03 PROCEDURE — 99291 CRITICAL CARE FIRST HOUR: CPT

## 2024-06-03 PROCEDURE — 83605 ASSAY OF LACTIC ACID: CPT | Performed by: PHYSICIAN ASSISTANT

## 2024-06-03 PROCEDURE — 99292 CRITICAL CARE ADDL 30 MIN: CPT | Performed by: EMERGENCY MEDICINE

## 2024-06-03 PROCEDURE — 999N000065 XR CHEST PORT 1 VIEW

## 2024-06-03 PROCEDURE — 86140 C-REACTIVE PROTEIN: CPT | Performed by: PHYSICIAN ASSISTANT

## 2024-06-03 PROCEDURE — 82805 BLOOD GASES W/O2 SATURATION: CPT | Performed by: PHYSICIAN ASSISTANT

## 2024-06-03 PROCEDURE — 99291 CRITICAL CARE FIRST HOUR: CPT | Performed by: EMERGENCY MEDICINE

## 2024-06-03 PROCEDURE — 999N000157 HC STATISTIC RCP TIME EA 10 MIN

## 2024-06-03 PROCEDURE — 87040 BLOOD CULTURE FOR BACTERIA: CPT | Performed by: PHYSICIAN ASSISTANT

## 2024-06-03 PROCEDURE — 250N000009 HC RX 250: Performed by: EMERGENCY MEDICINE

## 2024-06-03 PROCEDURE — 250N000011 HC RX IP 250 OP 636: Performed by: NURSE ANESTHETIST, CERTIFIED REGISTERED

## 2024-06-03 PROCEDURE — 96365 THER/PROPH/DIAG IV INF INIT: CPT | Performed by: EMERGENCY MEDICINE

## 2024-06-03 PROCEDURE — 96366 THER/PROPH/DIAG IV INF ADDON: CPT | Performed by: EMERGENCY MEDICINE

## 2024-06-03 PROCEDURE — 96375 TX/PRO/DX INJ NEW DRUG ADDON: CPT | Performed by: EMERGENCY MEDICINE

## 2024-06-03 PROCEDURE — 86900 BLOOD TYPING SEROLOGIC ABO: CPT | Performed by: EMERGENCY MEDICINE

## 2024-06-03 PROCEDURE — 5A1945Z RESPIRATORY VENTILATION, 24-96 CONSECUTIVE HOURS: ICD-10-PCS | Performed by: SURGERY

## 2024-06-03 RX ORDER — PIPERACILLIN SODIUM, TAZOBACTAM SODIUM 3; .375 G/15ML; G/15ML
3.38 INJECTION, POWDER, LYOPHILIZED, FOR SOLUTION INTRAVENOUS ONCE
Qty: 15 ML | Refills: 0 | Status: COMPLETED | OUTPATIENT
Start: 2024-06-03 | End: 2024-06-03

## 2024-06-03 RX ORDER — CLINDAMYCIN PHOSPHATE 900 MG/50ML
900 INJECTION, SOLUTION INTRAVENOUS ONCE
Qty: 50 ML | Refills: 0 | Status: COMPLETED | OUTPATIENT
Start: 2024-06-03 | End: 2024-06-03

## 2024-06-03 RX ORDER — PROPOFOL 10 MG/ML
INJECTION, EMULSION INTRAVENOUS
Status: DISCONTINUED
Start: 2024-06-03 | End: 2024-06-03 | Stop reason: HOSPADM

## 2024-06-03 RX ORDER — PROPOFOL 10 MG/ML
INJECTION, EMULSION INTRAVENOUS PRN
Status: DISCONTINUED | OUTPATIENT
Start: 2024-06-03 | End: 2024-06-03

## 2024-06-03 RX ORDER — IOPAMIDOL 755 MG/ML
90 INJECTION, SOLUTION INTRAVASCULAR ONCE
Status: COMPLETED | OUTPATIENT
Start: 2024-06-03 | End: 2024-06-03

## 2024-06-03 RX ORDER — FENTANYL CITRATE 50 UG/ML
100 INJECTION, SOLUTION INTRAMUSCULAR; INTRAVENOUS ONCE
Status: COMPLETED | OUTPATIENT
Start: 2024-06-03 | End: 2024-06-03

## 2024-06-03 RX ORDER — PROPOFOL 10 MG/ML
5-75 INJECTION, EMULSION INTRAVENOUS CONTINUOUS
Status: DISCONTINUED | OUTPATIENT
Start: 2024-06-03 | End: 2024-06-05

## 2024-06-03 RX ORDER — PROPOFOL 10 MG/ML
5-75 INJECTION, EMULSION INTRAVENOUS CONTINUOUS
Status: DISCONTINUED | OUTPATIENT
Start: 2024-06-03 | End: 2024-06-03 | Stop reason: HOSPADM

## 2024-06-03 RX ORDER — FENTANYL CITRATE 50 UG/ML
INJECTION, SOLUTION INTRAMUSCULAR; INTRAVENOUS
Status: COMPLETED
Start: 2024-06-03 | End: 2024-06-03

## 2024-06-03 RX ADMIN — Medication 50 MCG/HR: at 23:22

## 2024-06-03 RX ADMIN — PROPOFOL 10 MCG/KG/MIN: 10 INJECTION, EMULSION INTRAVENOUS at 21:00

## 2024-06-03 RX ADMIN — NOREPINEPHRINE BITARTRATE 0.05 MCG/KG/MIN: 1 INJECTION, SOLUTION INTRAVENOUS at 22:52

## 2024-06-03 RX ADMIN — ROCURONIUM BROMIDE 50 MG: 10 INJECTION, SOLUTION INTRAVENOUS at 20:53

## 2024-06-03 RX ADMIN — FENTANYL CITRATE 100 MCG: 50 INJECTION, SOLUTION INTRAMUSCULAR; INTRAVENOUS at 23:03

## 2024-06-03 RX ADMIN — PROPOFOL 200 MG: 10 INJECTION, EMULSION INTRAVENOUS at 20:48

## 2024-06-03 RX ADMIN — VANCOMYCIN HYDROCHLORIDE 2500 MG: 5 INJECTION, POWDER, LYOPHILIZED, FOR SOLUTION INTRAVENOUS at 21:23

## 2024-06-03 RX ADMIN — Medication 160 MG: at 20:48

## 2024-06-03 RX ADMIN — PROPOFOL 60 MCG/KG/MIN: 10 INJECTION, EMULSION INTRAVENOUS at 22:29

## 2024-06-03 RX ADMIN — PIPERACILLIN AND TAZOBACTAM 3.38 G: 3; .375 INJECTION, POWDER, FOR SOLUTION INTRAVENOUS at 20:33

## 2024-06-03 RX ADMIN — IOPAMIDOL 90 ML: 755 INJECTION, SOLUTION INTRAVENOUS at 19:02

## 2024-06-03 RX ADMIN — CLINDAMYCIN PHOSPHATE 900 MG: 900 INJECTION, SOLUTION INTRAVENOUS at 21:09

## 2024-06-03 RX ADMIN — PROPOFOL 60 MCG/KG/MIN: 10 INJECTION, EMULSION INTRAVENOUS at 23:25

## 2024-06-03 ASSESSMENT — ACTIVITIES OF DAILY LIVING (ADL)
ADLS_ACUITY_SCORE: 35

## 2024-06-03 ASSESSMENT — COLUMBIA-SUICIDE SEVERITY RATING SCALE - C-SSRS
2. HAVE YOU ACTUALLY HAD ANY THOUGHTS OF KILLING YOURSELF IN THE PAST MONTH?: NO
1. IN THE PAST MONTH, HAVE YOU WISHED YOU WERE DEAD OR WISHED YOU COULD GO TO SLEEP AND NOT WAKE UP?: NO
IS THE PATIENT NOT ABLE TO COMPLETE C-SSRS: UNRESPONSIVE
6. HAVE YOU EVER DONE ANYTHING, STARTED TO DO ANYTHING, OR PREPARED TO DO ANYTHING TO END YOUR LIFE?: NO

## 2024-06-03 NOTE — ED PROVIDER NOTES
EMERGENCY DEPARTMENT ENCOUNTER      NAME: Giovanny Forbes Jr.  AGE: 58 year old male  YOB: 1965  MRN: [id number]  EVALUATION DATE & TIME: No admission date for patient encounter.    PCP: Lorne Ferrer    ED PROVIDER: Cody Moy PA-C      Chief Complaint   Patient presents with    Dental Problem    Facial Swelling         FINAL IMPRESSION:  1. Facial swelling    2. Necrotizing soft tissue infection    3. Hard to intubate, initial encounter          ED COURSE & MEDICAL DECISION MAKING:    Pertinent Labs & Imaging studies reviewed. (See chart for details)  5:18 PM I met the patient and performed my initial interview and exam.   7:55 PM Spoke with [facility].  8:08 PM Staffed with Dr. Espinoza.  8:14 PM Rechecked and updated patient on imaging results. Discussed further plan of care.  8:21 PM Spoke with Hospitalist from [facility].  8:28 PM Dr. Espinoza discussed plan for intubation with charge RN.  8:37 PM [facility] Hospitalist accepted the patient for ER to ER transfer admission. They have an ICU bed.  8:40 PM Started intubation with Dr. Espinoza.  8:52 PM RN ordered portable chest XR.  9:57 PM radiology contacted recommend pulling back tube another 2 to 3 cm.  Tube was redacted 1 cm from initial x-ray.  Patient in route to [facility].  Received clindamycin, vancomycin, and Zosyn here in the emergency department.  Intubated by anesthesia here, please see procedure note.      58 year old male presents to the Emergency Department for evaluation of left sided facial swelling.     ED Course as of 06/03/24 2109 Mon Jun 03, 2024 1742 58-year-old male, past medical history of hypertension, presents emergency department for evaluation of left-sided facial swelling.  Patient reports that this been ongoing since Friday.  Has had some intermittent low-grade fevers at home.  Been taking ibuprofen and Tylenol.  Went to see his dentist today, and was sent to the emergency  department.  On examination, he has obvious fluctuance and swelling to the left side of the jaw area, and underneath the left side of the jawline.  Very tender.  Patient does additionally have some trismus.  No appreciated posterior oropharyngeal swelling, or evidence of easily drainable abscess within the mouth, patient currently able to tolerate his own secretions easily and does not have any stridor or coarse breath sounds. Patient does have very poor dentition on the left lower side of the jaw in the posterior.  Otherwise, he does not have any other focal symptoms.  Not tachycardic tachypneic.  Will obtain CT soft tissue here, as well as basic labs here in the emergency department.  Pending CT imaging, will discuss further potential drainage versus IV antibiotics.   1829 Significant leukocytosis here in the emergency department.  Consistent with infection.   1851 CRP Inflammation(!): 285.00   1957 Spoke with [facility] regarding CT soft tissue of the neck, significant amount of air and gas surrounding the third mandibular molar.  Examination here shows significant increase in swelling to the left side of the jaw, as well as increasing difficulty with swallowing.  Very concerning here for possible airway compromise.     2000 CT soft tissue of the neck here shows unerupted bilateral third maxillary and left mandibular molars, the lateral malposition with surrounding osseous lunacy.  Continuous and contiguous moderate soft tissue induration involving the floor of the mouth as well as the submandibular, parapharyngeal, and  spaces on the left where there is moderate volume soft tissue air as well as several small locules of fluid.  Mild retropharyngeal edema and thickening.  Moderate thickening of the platysma muscle.  No discrete drainable rim-enhancing fluid collection within the neck.    IMPRESSION:   1.  Lucency surrounding the unerupted malpositioned left third mandibular molar contiguous with  moderate soft tissue inflammatory changes involving the floor mouth as well as the submandibular, , and parapharyngeal spaces. Associated scattered   foci of gas and partial effacement of the oropharyngeal/hypopharyngeal airway. Findings are likely dental in etiology and compatible with gas-forming soft tissue infection, which can be seen with cellulitis, myonecrosis, and/or necrotizing fasciitis.   Correlate clinically to exclude Obed angina. Surgical evaluation is recommended.  2.  Mild retropharyngeal soft tissue edema and thickening without rim enhancement, most compatible with retropharyngeal cellulitis/effusion.   3.  No drainable neck abscess at this time.     2010: Patient seen and reevaluated here in the emergency department, significant increase in the swelling to the left side of the neck, as well as now difficulty with swallowing, hoarse voice which was not present on initial examination here.  Patient will likely require urgent intervention for likely necrotizing infection here as demonstrated on CT.  Will reach out to facilities with OMFS capable services for emergent transfer.  Discussed intubation with the patient, anesthesia will be called to the bedside, patient staffed with Dr. Espinoza, who is also at the bedside at this time.  With the assistance of anesthesia, patient will be intubated to secure the airway given increasing swelling in neck    2020 Spoke with [facility], no OMFS service available today.   2030 Spoke with [facility] unable to accept the patient here as they are at critical capacity   2038 Spoke with Dr. De, [facility], patient accepted for emergent ED to ED transfer, patient excepted ED to ED.   2055     With the assistance of the anesthesia, patient was intubated, propofol infusion ordered here in the emergency department for sedation.  Patient currently receiving Zosyn.  Will be transferred emergently to [facility] for possible  surgery.      Will be transferred emergently via EMS, intubated, for further assessment, and admission to the ICU.  Will likely require assessment by OMFS.  Did receive IV antibiotics.           Medical Decision Making  Obtained supplemental history:Supplemental history obtained?: Documented in chart  Reviewed external records: External records reviewed?: Documented in chart  Care impacted by chronic illness:Hyperlipidemia and Hypertension  Care significantly affected by social determinants of health:Access to Affordable Health Care  Did you consider but not order tests?: Work up considered but not performed and documented in chart, if applicable  Did you interpret images independently?: Independent interpretation of ECG and images noted in documentation, when applicable.  Consultation discussion with other provider:Did you involve another provider (consultant, , pharmacy, etc.)?: I discussed the care with another health care provider, see documentation for details.  ER to ER transfer for admission to [facility]         At the conclusion of the encounter I discussed the results of all of the tests and the disposition. The questions were answered. The patient or family acknowledged understanding and was agreeable with the care plan.     MEDICATIONS GIVEN IN THE EMERGENCY:  Medications   sodium chloride (PF) 0.9% PF flush 3 mL (has no administration in time range)   sodium chloride (PF) 0.9% PF flush 3 mL (3 mLs Intracatheter $Given 6/3/24 2101)   clindamycin (CLEOCIN) 900 mg in 50 mL D5W intermittent infusion (has no administration in time range)   propofol (DIPRIVAN) infusion (10 mcg/kg/min × 113.4 kg Intravenous $New Bag 6/3/24 2100)   vancomycin (VANCOCIN) 2,500 mg in 0.9% NaCl 500 mL intermittent infusion (has no administration in time range)   iopamidol (ISOVUE-370) solution 90 mL (90 mLs Intravenous $Given 6/3/24 1902)   piperacillin-tazobactam (ZOSYN) 3.375 g vial to attach to  mL bag (3.375 g  Intravenous $New Bag 6/3/24 2033)       NEW PRESCRIPTIONS STARTED AT TODAY'S ER VISIT  New Prescriptions    No medications on file          =================================================================    HPI    Patient information was obtained from: Patient, patient's spouse    Use of : N/A        Giovanny Forbes Jr. is a 58 year old male with a pertinent history of HTN and HLD,  who presents to this ED via walk-in with spouse for evaluation of dental problem and facial swelling.    Patient reports he started to develop left-sided facial swelling 3 days ago and notes welling has since significantly increased over the past few days. Endorses pain with swallowing. He is not currently on antibiotics. He was seen by a dentist earlier today and was advised to go to the ER to receive IV antibiotics, per patient's wife. Wife mentions they were told patient's wisdom tooth needs to be pulled out but only after the facial swelling goes down. At the dentist visit, he was told there was a huge abscess that needs to be drained. Patient was given a number to call and scheduled an appointment to see an oral surgeon in order to get tooth pulled, however, the earliest they can call in order to schedule is tomorrow at 8 AM. Patient has been having chills at home, per wife. No other reported complaints or concerns at this time.      Critical care 75 minutes excluding separately billable procedures.  Includes bedside management, time reviewing test results, review of records, discussing thecase with staff, documenting the medical record and time spent with family members (or surrogate decision makers) discussing specific treatment issues.      PAST MEDICAL HISTORY:  Past Medical History:   Diagnosis Date    HTN (hypertension)        PAST SURGICAL HISTORY:  Past Surgical History:   Procedure Laterality Date    HERNIA REPAIR, INGUINAL RT/LT Bilateral     age 11       CURRENT MEDICATIONS:    hydrochlorothiazide  "(HYDRODIURIL) 25 MG tablet  study - aspirin, IDS# [id number], 81 mg tablet      ALLERGIES:  No Known Allergies    FAMILY HISTORY:  Family History   Problem Relation Age of Onset    Hypertension Mother     Hypertension Father     Myocardial Infarction Father 42    Asthma Maternal Grandmother     Diabetes Type 2  Maternal Grandmother        SOCIAL HISTORY:   Social History     Socioeconomic History    Marital status:    Tobacco Use    Smoking status: Never     Passive exposure: Never    Smokeless tobacco: Never   Vaping Use    Vaping status: Never Used     Social Determinants of Health      [facility]    Financial Resource Strain    [facility]    Social Connections   Interpersonal Safety: Low Risk  (4/30/2024)    Interpersonal Safety     Do you feel physically and emotionally safe where you currently live?: Yes     Within the past 12 months, have you been hit, slapped, kicked or otherwise physically hurt by someone?: No     Within the past 12 months, have you been humiliated or emotionally abused in other ways by your partner or ex-partner?: No       VITALS:  BP (!) 190/95 (BP Location: Right arm)   Pulse 109   Temp 99.9  F (37.7  C) (Oral)   Resp (!) 34   Ht 1.892 m (6' 2.5\")   Wt 113.4 kg (250 lb)   SpO2 99%   BMI 31.67 kg/m      PHYSICAL EXAM    Physical Exam  Vitals and nursing note reviewed.   Constitutional:       General: He is not in acute distress.     Appearance: Normal appearance. He is normal weight. He is not toxic-appearing or diaphoretic.   HENT:      Right Ear: External ear normal.      Left Ear: External ear normal.      Mouth/Throat:      Mouth: Mucous membranes are moist.      Comments: No posterior oropharyngeal erythema or exudate.  Uvula midline.  No deviation.  No trismus.  No evidence of focal " drainable abscess viewable in the mouth  Eyes:      Conjunctiva/sclera: Conjunctivae normal.   Cardiovascular:      Rate and Rhythm: Normal rate and regular rhythm.   Pulmonary:      Effort: Pulmonary effort is normal.   Abdominal:      General: Abdomen is flat.      Palpations: Abdomen is soft.      Tenderness: There is no abdominal tenderness. There is no guarding or rebound.   Skin:     Findings: Erythema present.      Comments: Significant amount of erythema, tenderness, and fluctuance noted to the left lateral side of the jaw, left side of the face.   Neurological:      Mental Status: He is alert. Mental status is at baseline.             LAB:  All pertinent labs reviewed and interpreted.  Labs Ordered and Resulted from Time of ED Arrival to Time of ED Departure   CRP INFLAMMATION - Abnormal       Result Value    CRP Inflammation 285.00 (*)    BASIC METABOLIC PANEL - Abnormal    Sodium 135      Potassium 3.9      Chloride 99      Carbon Dioxide (CO2) 25      Anion Gap 11      Urea Nitrogen 12.9      Creatinine 1.29 (*)     GFR Estimate 63      Calcium 10.2      Glucose 122 (*)    CBC WITH PLATELETS AND DIFFERENTIAL - Abnormal    WBC Count 18.6 (*)     RBC Count 4.48      Hemoglobin 12.4 (*)     Hematocrit 37.7 (*)     MCV 84      MCH 27.7      MCHC 32.9      RDW 14.1      Platelet Count 271      % Neutrophils 85      % Lymphocytes 5      % Monocytes 8      % Eosinophils 0      % Basophils 0      % Immature Granulocytes 2      NRBCs per 100 WBC 0      Absolute Neutrophils 15.8 (*)     Absolute Lymphocytes 0.9      Absolute Monocytes 1.5 (*)     Absolute Eosinophils 0.0      Absolute Basophils 0.0      Absolute Immature Granulocytes 0.4      Absolute NRBCs 0.0     BLOOD GAS VENOUS - Abnormal    pH Venous 7.44 (*)     pCO2 Venous 42      pO2 Venous 43      Bicarbonate Venous 28      Base Excess/Deficit Venous 3.9 (*)     FIO2 21      Oxyhemoglobin Venous 78 (*)     O2 Sat, Venous 79.3 (*)    LACTIC ACID WHOLE  BLOOD WITH 1X REPEAT IN 2 HR WHEN >2 - Normal    Lactic Acid, Initial 1.3     BLOOD CULTURE   BLOOD CULTURE       RADIOLOGY:  Reviewed all pertinent imaging. Please see official radiology report.  Soft tissue neck CT w contrast   Final Result   IMPRESSION:    1.  Lucency surrounding the unerupted malpositioned left third mandibular molar contiguous with moderate soft tissue inflammatory changes involving the floor mouth as well as the submandibular, , and parapharyngeal spaces. Associated scattered    foci of gas and partial effacement of the oropharyngeal/hypopharyngeal airway. Findings are likely dental in etiology and compatible with gas-forming soft tissue infection, which can be seen with cellulitis, myonecrosis, and/or necrotizing fasciitis.    Correlate clinically to exclude Obed angina. Surgical evaluation is recommended.   2.  Mild retropharyngeal soft tissue edema and thickening without rim enhancement, most compatible with retropharyngeal cellulitis/effusion.    3.  No drainable neck abscess at this time.       These findings were discussed over the phone with Dr Cody Moy on 6/3/2024 at 1955 CST.      XR Chest Port 1 View    (Results Pending)       EKG:    Performed at: 6/3/2024 20:30:28    Impression: Sinus tachycardia. Incomplete right bundle branch block. Nonspecific T wave abnormality.    Rate: 103 BPM  Rhythm: Sinus tachycardia  Axis: 47  WI Interval: 166 ms  QRS Interval: 100 ms  QTc Interval: 440 ms  ST Changes: No ST changes.  Comparison: When compared with ECG of 1/20/2020 21:25, Incomplete right bundle branch block is now present. ST no longer depressed in Inferior leads. T wave inversion no longer evident in Inferior leads. Nonspecific T wave abnormality, worse in Lateral leads.    I have reviewed and interpreted the EKG(s) documented above along with Dr. Espinoza, ED MD.      PROCEDURES:     See anesthesia note for intubation.       I, Shoshana Lopez, am serving as a  scribe to document services personally performed by Cody Moy PA-C, based on my observation and the provider's statements to me. I, Cody Moy PA-C, attest that Shoshana Lopez is acting in a scribe capacity, has observed my performance of the services and has documented them in accordance with my direction.    Cody Moy PA-C  Emergency Medicine  [facility] EMERGENCY ROOM  [address] [address]  [phone]  Dept: [phone]       Cody Moy PA-C  06/03/24 2216       Cody Moy PA-C  06/03/24 7048

## 2024-06-03 NOTE — ED TRIAGE NOTES
Pt presents to the ED with c/o worsening left sided facial swelling and pain. Pt has a wisdom tooth that has to be pulled.

## 2024-06-03 NOTE — DISCHARGE INSTRUCTIONS
For pain or fever you may use:  -Tylenol 650 mg every 6 hours.  Max 4000 mg in 24 hours  Do not use thismedication with alcohol as it can cause liver problems.  -Ibuprofen 600 mg every 6 hours.  Max 3500 mg in 24 hours  Do not take this medication if you have a history of a GI bleed or have kidney problems.  You may use both of these medications at the same time or you can alternate them every 3 hours.  For example, Tylenol at 6 AM, ibuprofen at 9 AM, Tylenol at noon, etc.    
complains of pain/discomfort

## 2024-06-04 ENCOUNTER — ANESTHESIA EVENT (OUTPATIENT)
Dept: SURGERY | Facility: CLINIC | Age: 59
DRG: 143 | End: 2024-06-04
Payer: COMMERCIAL

## 2024-06-04 ENCOUNTER — APPOINTMENT (OUTPATIENT)
Dept: GENERAL RADIOLOGY | Facility: CLINIC | Age: 59
DRG: 143 | End: 2024-06-04
Payer: COMMERCIAL

## 2024-06-04 ENCOUNTER — ANESTHESIA (OUTPATIENT)
Dept: SURGERY | Facility: CLINIC | Age: 59
DRG: 143 | End: 2024-06-04
Payer: COMMERCIAL

## 2024-06-04 DIAGNOSIS — K12.2 SUBMANDIBULAR ABSCESS: Primary | ICD-10-CM

## 2024-06-04 DIAGNOSIS — K12.2 SUBLINGUAL ABSCESS: ICD-10-CM

## 2024-06-04 DIAGNOSIS — J39.0 ABSCESS OF LATERAL PHARYNGEAL SPACE: ICD-10-CM

## 2024-06-04 PROBLEM — M79.89 NECROTIZING SOFT TISSUE INFECTION: Status: ACTIVE | Noted: 2024-06-04

## 2024-06-04 LAB
ABO/RH(D): NORMAL
ALLEN'S TEST: ABNORMAL
ANION GAP SERPL CALCULATED.3IONS-SCNC: 11 MMOL/L (ref 7–15)
ANTIBODY SCREEN: NEGATIVE
BACTERIA SPEC CULT: ABNORMAL
BASE EXCESS BLDA CALC-SCNC: 2.8 MMOL/L (ref -3–3)
BUN SERPL-MCNC: 14 MG/DL (ref 8–23)
CALCIUM SERPL-MCNC: 8.6 MG/DL (ref 8.8–10.2)
CHLORIDE SERPL-SCNC: 104 MMOL/L (ref 98–107)
COHGB MFR BLD: 99.9 % (ref 96–97)
CREAT SERPL-MCNC: 1.18 MG/DL (ref 0.67–1.17)
CRP SERPL-MCNC: 278 MG/L
DEPRECATED HCO3 PLAS-SCNC: 22 MMOL/L (ref 22–29)
EGFRCR SERPLBLD CKD-EPI 2021: 71 ML/MIN/1.73M2
ERYTHROCYTE [DISTWIDTH] IN BLOOD BY AUTOMATED COUNT: 14.5 % (ref 10–15)
GLUCOSE BLDC GLUCOMTR-MCNC: 116 MG/DL (ref 70–99)
GLUCOSE BLDC GLUCOMTR-MCNC: 129 MG/DL (ref 70–99)
GLUCOSE BLDC GLUCOMTR-MCNC: 130 MG/DL (ref 70–99)
GLUCOSE BLDC GLUCOMTR-MCNC: 135 MG/DL (ref 70–99)
GLUCOSE BLDC GLUCOMTR-MCNC: 138 MG/DL (ref 70–99)
GLUCOSE BLDC GLUCOMTR-MCNC: 144 MG/DL (ref 70–99)
GLUCOSE SERPL-MCNC: 133 MG/DL (ref 70–99)
GRAM STAIN RESULT: ABNORMAL
GRAM STAIN RESULT: ABNORMAL
HCO3 BLD-SCNC: 26 MMOL/L (ref 21–28)
HCT VFR BLD AUTO: 30.9 % (ref 40–53)
HGB BLD-MCNC: 10.2 G/DL (ref 13.3–17.7)
HOLD SPECIMEN: NORMAL
LACTATE SERPL-SCNC: 0.9 MMOL/L (ref 0.7–2)
MCH RBC QN AUTO: 28.3 PG (ref 26.5–33)
MCHC RBC AUTO-ENTMCNC: 33 G/DL (ref 31.5–36.5)
MCV RBC AUTO: 86 FL (ref 78–100)
O2/TOTAL GAS SETTING VFR VENT: 40 %
PCO2 BLD: 34 MM HG (ref 35–45)
PH BLD: 7.49 [PH] (ref 7.35–7.45)
PLATELET # BLD AUTO: 237 10E3/UL (ref 150–450)
PO2 BLD: 127 MM HG (ref 80–105)
POTASSIUM SERPL-SCNC: 4 MMOL/L (ref 3.4–5.3)
RBC # BLD AUTO: 3.61 10E6/UL (ref 4.4–5.9)
SAO2 % BLDA: 98 % (ref 92–100)
SODIUM SERPL-SCNC: 137 MMOL/L (ref 135–145)
SPECIMEN EXPIRATION DATE: NORMAL
WBC # BLD AUTO: 13.9 10E3/UL (ref 4–11)

## 2024-06-04 PROCEDURE — 999N000185 HC STATISTIC TRANSPORT TIME EA 15 MIN

## 2024-06-04 PROCEDURE — 250N000009 HC RX 250: Performed by: EMERGENCY MEDICINE

## 2024-06-04 PROCEDURE — 99140 ANES COMP EMERGENCY COND: CPT | Performed by: ANESTHESIOLOGY

## 2024-06-04 PROCEDURE — 258N000003 HC RX IP 258 OP 636: Performed by: STUDENT IN AN ORGANIZED HEALTH CARE EDUCATION/TRAINING PROGRAM

## 2024-06-04 PROCEDURE — 250N000011 HC RX IP 250 OP 636: Performed by: EMERGENCY MEDICINE

## 2024-06-04 PROCEDURE — 87070 CULTURE OTHR SPECIMN AEROBIC: CPT | Performed by: DENTIST

## 2024-06-04 PROCEDURE — 83605 ASSAY OF LACTIC ACID: CPT

## 2024-06-04 PROCEDURE — 85027 COMPLETE CBC AUTOMATED: CPT

## 2024-06-04 PROCEDURE — 80048 BASIC METABOLIC PNL TOTAL CA: CPT

## 2024-06-04 PROCEDURE — 74018 RADEX ABDOMEN 1 VIEW: CPT

## 2024-06-04 PROCEDURE — 86140 C-REACTIVE PROTEIN: CPT

## 2024-06-04 PROCEDURE — 0CDXXZ0 EXTRACTION OF LOWER TOOTH, SINGLE, EXTERNAL APPROACH: ICD-10-PCS | Performed by: DENTIST

## 2024-06-04 PROCEDURE — 0C9M0ZZ DRAINAGE OF PHARYNX, OPEN APPROACH: ICD-10-PCS | Performed by: DENTIST

## 2024-06-04 PROCEDURE — 250N000013 HC RX MED GY IP 250 OP 250 PS 637

## 2024-06-04 PROCEDURE — 99140 ANES COMP EMERGENCY COND: CPT | Performed by: NURSE ANESTHETIST, CERTIFIED REGISTERED

## 2024-06-04 PROCEDURE — 999N000157 HC STATISTIC RCP TIME EA 10 MIN

## 2024-06-04 PROCEDURE — 99233 SBSQ HOSP IP/OBS HIGH 50: CPT | Mod: GC | Performed by: SURGERY

## 2024-06-04 PROCEDURE — 41899 UNLISTED PX DENTALVLR STRUX: CPT | Performed by: NURSE ANESTHETIST, CERTIFIED REGISTERED

## 2024-06-04 PROCEDURE — 82805 BLOOD GASES W/O2 SATURATION: CPT

## 2024-06-04 PROCEDURE — 87075 CULTR BACTERIA EXCEPT BLOOD: CPT | Performed by: DENTIST

## 2024-06-04 PROCEDURE — 200N000002 HC R&B ICU UMMC

## 2024-06-04 PROCEDURE — 250N000011 HC RX IP 250 OP 636: Performed by: NURSE ANESTHETIST, CERTIFIED REGISTERED

## 2024-06-04 PROCEDURE — 250N000009 HC RX 250: Performed by: DENTIST

## 2024-06-04 PROCEDURE — 71045 X-RAY EXAM CHEST 1 VIEW: CPT

## 2024-06-04 PROCEDURE — 360N000075 HC SURGERY LEVEL 2, PER MIN: Performed by: DENTIST

## 2024-06-04 PROCEDURE — 74018 RADEX ABDOMEN 1 VIEW: CPT | Mod: 26 | Performed by: STUDENT IN AN ORGANIZED HEALTH CARE EDUCATION/TRAINING PROGRAM

## 2024-06-04 PROCEDURE — 71045 X-RAY EXAM CHEST 1 VIEW: CPT | Mod: 26 | Performed by: RADIOLOGY

## 2024-06-04 PROCEDURE — 370N000017 HC ANESTHESIA TECHNICAL FEE, PER MIN: Performed by: DENTIST

## 2024-06-04 PROCEDURE — 3E0G76Z INTRODUCTION OF NUTRITIONAL SUBSTANCE INTO UPPER GI, VIA NATURAL OR ARTIFICIAL OPENING: ICD-10-PCS | Performed by: SURGERY

## 2024-06-04 PROCEDURE — 250N000011 HC RX IP 250 OP 636: Performed by: DENTIST

## 2024-06-04 PROCEDURE — 0J910ZZ DRAINAGE OF FACE SUBCUTANEOUS TISSUE AND FASCIA, OPEN APPROACH: ICD-10-PCS | Performed by: DENTIST

## 2024-06-04 PROCEDURE — 272N000001 HC OR GENERAL SUPPLY STERILE: Performed by: DENTIST

## 2024-06-04 PROCEDURE — 710N000011 HC RECOVERY PHASE 1, LEVEL 3, PER MIN: Performed by: DENTIST

## 2024-06-04 PROCEDURE — 258N000003 HC RX IP 258 OP 636

## 2024-06-04 PROCEDURE — 250N000012 HC RX MED GY IP 250 OP 636 PS 637

## 2024-06-04 PROCEDURE — 250N000009 HC RX 250: Performed by: NURSE ANESTHETIST, CERTIFIED REGISTERED

## 2024-06-04 PROCEDURE — 258N000003 HC RX IP 258 OP 636: Performed by: EMERGENCY MEDICINE

## 2024-06-04 PROCEDURE — 94003 VENT MGMT INPAT SUBQ DAY: CPT

## 2024-06-04 PROCEDURE — 250N000011 HC RX IP 250 OP 636

## 2024-06-04 PROCEDURE — 41899 UNLISTED PX DENTALVLR STRUX: CPT | Performed by: ANESTHESIOLOGY

## 2024-06-04 PROCEDURE — C9113 INJ PANTOPRAZOLE SODIUM, VIA: HCPCS

## 2024-06-04 PROCEDURE — 87077 CULTURE AEROBIC IDENTIFY: CPT | Performed by: DENTIST

## 2024-06-04 PROCEDURE — 250N000025 HC SEVOFLURANE, PER MIN: Performed by: DENTIST

## 2024-06-04 PROCEDURE — 87205 SMEAR GRAM STAIN: CPT | Performed by: DENTIST

## 2024-06-04 PROCEDURE — 87102 FUNGUS ISOLATION CULTURE: CPT | Performed by: DENTIST

## 2024-06-04 RX ORDER — AMOXICILLIN 250 MG
2 CAPSULE ORAL 2 TIMES DAILY
Status: DISCONTINUED | OUTPATIENT
Start: 2024-06-04 | End: 2024-06-06 | Stop reason: HOSPADM

## 2024-06-04 RX ORDER — POLYETHYLENE GLYCOL 3350 17 G/17G
17 POWDER, FOR SOLUTION ORAL DAILY
Status: DISCONTINUED | OUTPATIENT
Start: 2024-06-04 | End: 2024-06-06 | Stop reason: HOSPADM

## 2024-06-04 RX ORDER — SODIUM CHLORIDE, SODIUM GLUCONATE, SODIUM ACETATE, POTASSIUM CHLORIDE AND MAGNESIUM CHLORIDE 526; 502; 368; 37; 30 MG/100ML; MG/100ML; MG/100ML; MG/100ML; MG/100ML
INJECTION, SOLUTION INTRAVENOUS CONTINUOUS PRN
Status: DISCONTINUED | OUTPATIENT
Start: 2024-06-04 | End: 2024-06-04

## 2024-06-04 RX ORDER — FENTANYL CITRATE 50 UG/ML
50 INJECTION, SOLUTION INTRAMUSCULAR; INTRAVENOUS EVERY 5 MIN PRN
Status: DISCONTINUED | OUTPATIENT
Start: 2024-06-04 | End: 2024-06-04 | Stop reason: HOSPADM

## 2024-06-04 RX ORDER — DEXTROSE MONOHYDRATE 25 G/50ML
25-50 INJECTION, SOLUTION INTRAVENOUS
Status: DISCONTINUED | OUTPATIENT
Start: 2024-06-04 | End: 2024-06-06 | Stop reason: HOSPADM

## 2024-06-04 RX ORDER — ALBUTEROL SULFATE 0.83 MG/ML
2.5 SOLUTION RESPIRATORY (INHALATION) EVERY 4 HOURS PRN
Status: DISCONTINUED | OUTPATIENT
Start: 2024-06-04 | End: 2024-06-04 | Stop reason: HOSPADM

## 2024-06-04 RX ORDER — DEXTROSE MONOHYDRATE 25 G/50ML
25-50 INJECTION, SOLUTION INTRAVENOUS
Status: DISCONTINUED | OUTPATIENT
Start: 2024-06-04 | End: 2024-06-04

## 2024-06-04 RX ORDER — AMOXICILLIN 250 MG
1 CAPSULE ORAL 2 TIMES DAILY
Status: DISCONTINUED | OUTPATIENT
Start: 2024-06-04 | End: 2024-06-06 | Stop reason: HOSPADM

## 2024-06-04 RX ORDER — DEXAMETHASONE SODIUM PHOSPHATE 10 MG/ML
10 INJECTION, SOLUTION INTRAMUSCULAR; INTRAVENOUS EVERY 8 HOURS
Qty: 3 ML | Refills: 0 | Status: COMPLETED | OUTPATIENT
Start: 2024-06-04 | End: 2024-06-05

## 2024-06-04 RX ORDER — DEXAMETHASONE SODIUM PHOSPHATE 4 MG/ML
4 INJECTION, SOLUTION INTRA-ARTICULAR; INTRALESIONAL; INTRAMUSCULAR; INTRAVENOUS; SOFT TISSUE
Status: DISCONTINUED | OUTPATIENT
Start: 2024-06-04 | End: 2024-06-04 | Stop reason: HOSPADM

## 2024-06-04 RX ORDER — NALOXONE HYDROCHLORIDE 0.4 MG/ML
0.2 INJECTION, SOLUTION INTRAMUSCULAR; INTRAVENOUS; SUBCUTANEOUS
Status: DISCONTINUED | OUTPATIENT
Start: 2024-06-04 | End: 2024-06-06 | Stop reason: HOSPADM

## 2024-06-04 RX ORDER — ONDANSETRON 2 MG/ML
4 INJECTION INTRAMUSCULAR; INTRAVENOUS EVERY 30 MIN PRN
Status: DISCONTINUED | OUTPATIENT
Start: 2024-06-04 | End: 2024-06-04 | Stop reason: HOSPADM

## 2024-06-04 RX ORDER — NALOXONE HYDROCHLORIDE 0.4 MG/ML
0.1 INJECTION, SOLUTION INTRAMUSCULAR; INTRAVENOUS; SUBCUTANEOUS
Status: DISCONTINUED | OUTPATIENT
Start: 2024-06-04 | End: 2024-06-04 | Stop reason: HOSPADM

## 2024-06-04 RX ORDER — ASPIRIN 81 MG/1
81 TABLET ORAL DAILY
COMMUNITY

## 2024-06-04 RX ORDER — FENTANYL CITRATE 50 UG/ML
25 INJECTION, SOLUTION INTRAMUSCULAR; INTRAVENOUS EVERY 5 MIN PRN
Status: DISCONTINUED | OUTPATIENT
Start: 2024-06-04 | End: 2024-06-04 | Stop reason: HOSPADM

## 2024-06-04 RX ORDER — ONDANSETRON 4 MG/1
4 TABLET, ORALLY DISINTEGRATING ORAL EVERY 30 MIN PRN
Status: DISCONTINUED | OUTPATIENT
Start: 2024-06-04 | End: 2024-06-04 | Stop reason: HOSPADM

## 2024-06-04 RX ORDER — FENTANYL CITRATE 50 UG/ML
INJECTION, SOLUTION INTRAMUSCULAR; INTRAVENOUS PRN
Status: DISCONTINUED | OUTPATIENT
Start: 2024-06-04 | End: 2024-06-04

## 2024-06-04 RX ORDER — PIPERACILLIN SODIUM, TAZOBACTAM SODIUM 3; .375 G/15ML; G/15ML
3.38 INJECTION, POWDER, LYOPHILIZED, FOR SOLUTION INTRAVENOUS EVERY 8 HOURS
Status: DISCONTINUED | OUTPATIENT
Start: 2024-06-04 | End: 2024-06-04

## 2024-06-04 RX ORDER — NICOTINE POLACRILEX 4 MG
15-30 LOZENGE BUCCAL
Status: DISCONTINUED | OUTPATIENT
Start: 2024-06-04 | End: 2024-06-06 | Stop reason: HOSPADM

## 2024-06-04 RX ORDER — ENOXAPARIN SODIUM 100 MG/ML
40 INJECTION SUBCUTANEOUS EVERY 24 HOURS
Status: DISCONTINUED | OUTPATIENT
Start: 2024-06-04 | End: 2024-06-06 | Stop reason: HOSPADM

## 2024-06-04 RX ORDER — TRANSGALACTOOLIGOSACCHARIDES 2.75 G
1 POWDER IN PACKET (EA) ORAL 4 TIMES DAILY
Status: DISCONTINUED | OUTPATIENT
Start: 2024-06-04 | End: 2024-06-05

## 2024-06-04 RX ORDER — ONDANSETRON 2 MG/ML
INJECTION INTRAMUSCULAR; INTRAVENOUS PRN
Status: DISCONTINUED | OUTPATIENT
Start: 2024-06-04 | End: 2024-06-04

## 2024-06-04 RX ORDER — CLINDAMYCIN PHOSPHATE 900 MG/50ML
900 INJECTION, SOLUTION INTRAVENOUS EVERY 8 HOURS
Status: DISCONTINUED | OUTPATIENT
Start: 2024-06-04 | End: 2024-06-06 | Stop reason: HOSPADM

## 2024-06-04 RX ORDER — NALOXONE HYDROCHLORIDE 0.4 MG/ML
0.4 INJECTION, SOLUTION INTRAMUSCULAR; INTRAVENOUS; SUBCUTANEOUS
Status: DISCONTINUED | OUTPATIENT
Start: 2024-06-04 | End: 2024-06-06 | Stop reason: HOSPADM

## 2024-06-04 RX ORDER — DEXAMETHASONE SODIUM PHOSPHATE 10 MG/ML
10 INJECTION, SOLUTION INTRAMUSCULAR; INTRAVENOUS EVERY 8 HOURS
Status: DISCONTINUED | OUTPATIENT
Start: 2024-06-04 | End: 2024-06-04

## 2024-06-04 RX ORDER — GUAIFENESIN 600 MG/1
15 TABLET, EXTENDED RELEASE ORAL DAILY
Status: DISCONTINUED | OUTPATIENT
Start: 2024-06-04 | End: 2024-06-05

## 2024-06-04 RX ORDER — BUPIVACAINE HYDROCHLORIDE AND EPINEPHRINE 2.5; 5 MG/ML; UG/ML
INJECTION, SOLUTION INFILTRATION; PERINEURAL PRN
Status: DISCONTINUED | OUTPATIENT
Start: 2024-06-04 | End: 2024-06-04 | Stop reason: HOSPADM

## 2024-06-04 RX ORDER — SODIUM CHLORIDE, SODIUM LACTATE, POTASSIUM CHLORIDE, CALCIUM CHLORIDE 600; 310; 30; 20 MG/100ML; MG/100ML; MG/100ML; MG/100ML
INJECTION, SOLUTION INTRAVENOUS CONTINUOUS
Status: DISCONTINUED | OUTPATIENT
Start: 2024-06-04 | End: 2024-06-04 | Stop reason: HOSPADM

## 2024-06-04 RX ORDER — PIPERACILLIN SODIUM, TAZOBACTAM SODIUM 4; .5 G/20ML; G/20ML
4.5 INJECTION, POWDER, LYOPHILIZED, FOR SOLUTION INTRAVENOUS EVERY 6 HOURS
Status: DISCONTINUED | OUTPATIENT
Start: 2024-06-04 | End: 2024-06-06 | Stop reason: HOSPADM

## 2024-06-04 RX ORDER — DEXAMETHASONE SODIUM PHOSPHATE 4 MG/ML
INJECTION, SOLUTION INTRA-ARTICULAR; INTRALESIONAL; INTRAMUSCULAR; INTRAVENOUS; SOFT TISSUE PRN
Status: DISCONTINUED | OUTPATIENT
Start: 2024-06-04 | End: 2024-06-04

## 2024-06-04 RX ORDER — DEXTROSE MONOHYDRATE 100 MG/ML
INJECTION, SOLUTION INTRAVENOUS CONTINUOUS PRN
Status: DISCONTINUED | OUTPATIENT
Start: 2024-06-04 | End: 2024-06-06 | Stop reason: HOSPADM

## 2024-06-04 RX ORDER — HYDROMORPHONE HCL IN WATER/PF 6 MG/30 ML
0.4 PATIENT CONTROLLED ANALGESIA SYRINGE INTRAVENOUS EVERY 5 MIN PRN
Status: DISCONTINUED | OUTPATIENT
Start: 2024-06-04 | End: 2024-06-04 | Stop reason: HOSPADM

## 2024-06-04 RX ORDER — NICOTINE POLACRILEX 4 MG
15-30 LOZENGE BUCCAL
Status: DISCONTINUED | OUTPATIENT
Start: 2024-06-04 | End: 2024-06-04

## 2024-06-04 RX ORDER — CHLORHEXIDINE GLUCONATE ORAL RINSE 1.2 MG/ML
15 SOLUTION DENTAL EVERY 12 HOURS
Status: DISCONTINUED | OUTPATIENT
Start: 2024-06-04 | End: 2024-06-05

## 2024-06-04 RX ORDER — HYDROMORPHONE HCL IN WATER/PF 6 MG/30 ML
0.2 PATIENT CONTROLLED ANALGESIA SYRINGE INTRAVENOUS EVERY 5 MIN PRN
Status: DISCONTINUED | OUTPATIENT
Start: 2024-06-04 | End: 2024-06-04 | Stop reason: HOSPADM

## 2024-06-04 RX ORDER — AMINO ACIDS/PROTEIN HYDROLYS 11G-40/45
1 LIQUID IN PACKET (ML) ORAL 3 TIMES DAILY
Status: DISCONTINUED | OUTPATIENT
Start: 2024-06-04 | End: 2024-06-04

## 2024-06-04 RX ORDER — SODIUM CHLORIDE, SODIUM LACTATE, POTASSIUM CHLORIDE, CALCIUM CHLORIDE 600; 310; 30; 20 MG/100ML; MG/100ML; MG/100ML; MG/100ML
INJECTION, SOLUTION INTRAVENOUS CONTINUOUS
Status: DISCONTINUED | OUTPATIENT
Start: 2024-06-04 | End: 2024-06-05

## 2024-06-04 RX ADMIN — SODIUM CHLORIDE, SODIUM GLUCONATE, SODIUM ACETATE, POTASSIUM CHLORIDE AND MAGNESIUM CHLORIDE: 526; 502; 368; 37; 30 INJECTION, SOLUTION INTRAVENOUS at 02:11

## 2024-06-04 RX ADMIN — CHLORHEXIDINE GLUCONATE 0.12% ORAL RINSE 15 ML: 1.2 LIQUID ORAL at 07:58

## 2024-06-04 RX ADMIN — PIPERACILLIN AND TAZOBACTAM 4.5 G: 4; .5 INJECTION, POWDER, LYOPHILIZED, FOR SOLUTION INTRAVENOUS at 07:58

## 2024-06-04 RX ADMIN — PANTOPRAZOLE SODIUM 40 MG: 40 INJECTION, POWDER, FOR SOLUTION INTRAVENOUS at 07:58

## 2024-06-04 RX ADMIN — SUGAMMADEX 200 MG: 100 INJECTION, SOLUTION INTRAVENOUS at 03:34

## 2024-06-04 RX ADMIN — ONDANSETRON 4 MG: 2 INJECTION INTRAMUSCULAR; INTRAVENOUS at 03:26

## 2024-06-04 RX ADMIN — SODIUM CHLORIDE, POTASSIUM CHLORIDE, SODIUM LACTATE AND CALCIUM CHLORIDE: 600; 310; 30; 20 INJECTION, SOLUTION INTRAVENOUS at 03:45

## 2024-06-04 RX ADMIN — Medication 15 ML: at 15:25

## 2024-06-04 RX ADMIN — PROPOFOL 40 MCG/KG/MIN: 10 INJECTION, EMULSION INTRAVENOUS at 19:43

## 2024-06-04 RX ADMIN — Medication 40 MG: at 01:58

## 2024-06-04 RX ADMIN — PROPOFOL 60 MCG/KG/MIN: 10 INJECTION, EMULSION INTRAVENOUS at 07:39

## 2024-06-04 RX ADMIN — ENOXAPARIN SODIUM 40 MG: 40 INJECTION SUBCUTANEOUS at 12:30

## 2024-06-04 RX ADMIN — Medication 1 PACKET: at 19:49

## 2024-06-04 RX ADMIN — VANCOMYCIN HYDROCHLORIDE 2000 MG: 1 INJECTION, POWDER, LYOPHILIZED, FOR SOLUTION INTRAVENOUS at 21:06

## 2024-06-04 RX ADMIN — DEXAMETHASONE SODIUM PHOSPHATE 10 MG: 10 INJECTION, SOLUTION INTRAMUSCULAR; INTRAVENOUS at 10:21

## 2024-06-04 RX ADMIN — PROPOFOL 35 MCG/KG/MIN: 10 INJECTION, EMULSION INTRAVENOUS at 23:20

## 2024-06-04 RX ADMIN — INSULIN ASPART 1 UNITS: 100 INJECTION, SOLUTION INTRAVENOUS; SUBCUTANEOUS at 20:10

## 2024-06-04 RX ADMIN — HYDROMORPHONE HYDROCHLORIDE 0.5 MG: 1 INJECTION, SOLUTION INTRAMUSCULAR; INTRAVENOUS; SUBCUTANEOUS at 02:33

## 2024-06-04 RX ADMIN — DOCUSATE SODIUM 50 MG AND SENNOSIDES 8.6 MG 1 TABLET: 8.6; 5 TABLET, FILM COATED ORAL at 19:49

## 2024-06-04 RX ADMIN — Medication 50 MCG/HR: at 08:20

## 2024-06-04 RX ADMIN — POLYETHYLENE GLYCOL 3350 17 G: 17 POWDER, FOR SOLUTION ORAL at 08:41

## 2024-06-04 RX ADMIN — DOCUSATE SODIUM 50 MG AND SENNOSIDES 8.6 MG 1 TABLET: 8.6; 5 TABLET, FILM COATED ORAL at 08:41

## 2024-06-04 RX ADMIN — DEXAMETHASONE SODIUM PHOSPHATE 10 MG: 10 INJECTION, SOLUTION INTRAMUSCULAR; INTRAVENOUS at 17:52

## 2024-06-04 RX ADMIN — SODIUM CHLORIDE, SODIUM GLUCONATE, SODIUM ACETATE, POTASSIUM CHLORIDE AND MAGNESIUM CHLORIDE: 526; 502; 368; 37; 30 INJECTION, SOLUTION INTRAVENOUS at 01:29

## 2024-06-04 RX ADMIN — FENTANYL CITRATE 50 MCG: 50 INJECTION INTRAMUSCULAR; INTRAVENOUS at 02:19

## 2024-06-04 RX ADMIN — CHLORHEXIDINE GLUCONATE 0.12% ORAL RINSE 15 ML: 1.2 LIQUID ORAL at 19:49

## 2024-06-04 RX ADMIN — PROPOFOL 60 MCG/KG/MIN: 10 INJECTION, EMULSION INTRAVENOUS at 01:20

## 2024-06-04 RX ADMIN — FENTANYL CITRATE 100 MCG: 50 INJECTION INTRAMUSCULAR; INTRAVENOUS at 02:27

## 2024-06-04 RX ADMIN — SODIUM CHLORIDE, SODIUM GLUCONATE, SODIUM ACETATE, POTASSIUM CHLORIDE AND MAGNESIUM CHLORIDE: 526; 502; 368; 37; 30 INJECTION, SOLUTION INTRAVENOUS at 03:06

## 2024-06-04 RX ADMIN — PROPOFOL 40 MCG/KG/MIN: 10 INJECTION, EMULSION INTRAVENOUS at 15:51

## 2024-06-04 RX ADMIN — PROPOFOL 60 MCG/KG/MIN: 10 INJECTION, EMULSION INTRAVENOUS at 03:45

## 2024-06-04 RX ADMIN — HYDROMORPHONE HYDROCHLORIDE 0.5 MG: 1 INJECTION, SOLUTION INTRAMUSCULAR; INTRAVENOUS; SUBCUTANEOUS at 02:24

## 2024-06-04 RX ADMIN — PIPERACILLIN AND TAZOBACTAM 4.5 G: 4; .5 INJECTION, POWDER, LYOPHILIZED, FOR SOLUTION INTRAVENOUS at 12:30

## 2024-06-04 RX ADMIN — CLINDAMYCIN IN 5 PERCENT DEXTROSE 900 MG: 18 INJECTION, SOLUTION INTRAVENOUS at 15:25

## 2024-06-04 RX ADMIN — SODIUM CHLORIDE, POTASSIUM CHLORIDE, SODIUM LACTATE AND CALCIUM CHLORIDE: 600; 310; 30; 20 INJECTION, SOLUTION INTRAVENOUS at 18:27

## 2024-06-04 RX ADMIN — FENTANYL CITRATE 50 MCG: 50 INJECTION INTRAMUSCULAR; INTRAVENOUS at 02:24

## 2024-06-04 RX ADMIN — CLINDAMYCIN IN 5 PERCENT DEXTROSE 900 MG: 18 INJECTION, SOLUTION INTRAVENOUS at 23:26

## 2024-06-04 RX ADMIN — SODIUM CHLORIDE, POTASSIUM CHLORIDE, SODIUM LACTATE AND CALCIUM CHLORIDE: 600; 310; 30; 20 INJECTION, SOLUTION INTRAVENOUS at 07:40

## 2024-06-04 RX ADMIN — CLINDAMYCIN IN 5 PERCENT DEXTROSE 900 MG: 18 INJECTION, SOLUTION INTRAVENOUS at 07:59

## 2024-06-04 RX ADMIN — PIPERACILLIN AND TAZOBACTAM 4.5 G: 4; .5 INJECTION, POWDER, LYOPHILIZED, FOR SOLUTION INTRAVENOUS at 19:48

## 2024-06-04 RX ADMIN — PROPOFOL 30 MCG/KG/MIN: 10 INJECTION, EMULSION INTRAVENOUS at 11:48

## 2024-06-04 RX ADMIN — Medication 1 PACKET: at 15:52

## 2024-06-04 RX ADMIN — DEXAMETHASONE SODIUM PHOSPHATE 10 MG: 4 INJECTION, SOLUTION INTRA-ARTICULAR; INTRALESIONAL; INTRAMUSCULAR; INTRAVENOUS; SOFT TISSUE at 02:12

## 2024-06-04 ASSESSMENT — ACTIVITIES OF DAILY LIVING (ADL)
ADLS_ACUITY_SCORE: 47
ADLS_ACUITY_SCORE: 35
ADLS_ACUITY_SCORE: 35
ADLS_ACUITY_SCORE: 47
ADLS_ACUITY_SCORE: 35
ADLS_ACUITY_SCORE: 35
ADLS_ACUITY_SCORE: 47
ADLS_ACUITY_SCORE: 47
ADLS_ACUITY_SCORE: 35
ADLS_ACUITY_SCORE: 47
ADLS_ACUITY_SCORE: 35
ADLS_ACUITY_SCORE: 35
ADLS_ACUITY_SCORE: 47
ADLS_ACUITY_SCORE: 35
ADLS_ACUITY_SCORE: 47
ADLS_ACUITY_SCORE: 35
ADLS_ACUITY_SCORE: 47
ADLS_ACUITY_SCORE: 47

## 2024-06-04 ASSESSMENT — COPD QUESTIONNAIRES: COPD: 0

## 2024-06-04 ASSESSMENT — ENCOUNTER SYMPTOMS
SEIZURES: 0
DYSRHYTHMIAS: 0

## 2024-06-04 ASSESSMENT — LIFESTYLE VARIABLES: TOBACCO_USE: 0

## 2024-06-04 NOTE — PROGRESS NOTES
SURGICAL ICU PROGRESS NOTE  06/04/2024      PRIMARY TEAM: OMFS  PRIMARY PHYSICIAN: Dr. Townsend  REASON FOR CRITICAL CARE ADMISSION: Mechanical ventilation   ADMITTING PHYSICIAN: Dr. Burrows  Date of Service (when I saw the patient): 06/04/2024     ASSESSMENT:  Giovanny Forbes Jr. is a 58 year old male with a history of HTN who presented to OSH ED with left sided facial swelling and fevers and imaging concerning for NSTI. He was intubated at OSH for airway protection and transferred to [facility] for further management. He is now s/p incision and drainage of left sided submandibular, sublingual and lateral pharyngeal space abscess, extraction of #17 with placement of 4 penrose drains (3 at left neck, 1 in mouth) on 6/4/202  Remained intubated postoperatively and admitted to SICU.     PLAN:  6/4  - Discuss extubation plan with OMFS, PST today in preparation  - Start tube feeds  - Remove brown   - Wean sedation as tolerated       Neurological:  # Acute postoperative pain   - Monitor neurological status. Delirium preventions and precautions.   - Pain: fentanyl gtt                    - Sedation plan: Propofol gtt, wean as tolerated  - RASS goal -1 to -2     HEENT:  #s/p incision and drainage left sided submandibular, sublingual and lateral pharyngeal space abscess, extraction of #17 with placement of 4 penrose drains (3 at left neck, 1 in mouth) on 6/4/2024  - HOB>30  - Warm packs to face, NO ICE  - Change fluffs to neck PRN saturation, moderate bloody / purulent drainage from neck is expected  - OMFS will continue to evaluate for penrose drain removal pending clinical improvement / decreased drainage. Normally they are in place for 2-5 days.  - Resume normal oral hygiene  - Decadron x3 doses     Pulmonary:   # Post operative ventilatory support  - VENT: , RR 16, FiO2 35%, Peep 5. Continue full vent support. PST when meets criteria.  Ventilatory bundle. HOB elevation   - CXR with adequate ETT position   -  Supplemental oxygen to keep saturation above 92 %.  - Per OMFS attending note, plan for 24-48 hours intubation given severe infection and airway deviation       Cardiovascular:    # Hx HTN  # Shock-distributive   - Monitor hemodynamic status. No pressor requirements on arrival to SICU.   - Hold PTA HCTZ     Gastroenterology/Nutrition:  # Protein calorie deficit malnutrition   - NPO while intubated  - OGT for medications. AXR shows adequate position.  - Nutrition consult for TF start today  - Ok for soft diet from OMFS perspective once extubated      Fluids/Electrolytes/Renal:   # DOUG  - Monitor input and output  - Creatinine 1.18  - LR @ 100 for IV fluid hydration  - Discontinue brown      Endocrine:  # Stress hyperglycemia   # Steroid induced hyperglycemia   - Sliding scale for glucose management. Goal to keep BG< 180 for optimal wound healing      ID:  # Leukocytosis   # NSTI  - Leukocytosis 18.6 -> 13.9  - Repeat CBC   - Continue clindamycin, zosyn, vancomycin  - Trend CRP     Positive cultures:  6/4 OR cultures - GPC     6/3 blood cultures pending     Heme:     # Acute blood loss anemia   - Preop hemoglobin 12.4, stable 10.2  - Transfuse if hgb <7.0 or signs/symptoms of hypoperfusion. Monitor and trend.   - Start Lovenox 40 mg daily     Musculoskeletal:  # Weakness and deconditioning of critical illness   - Physical and occupational therapy consult         General Cares/Prophylaxis:    DVT Prophylaxis: Pneumatic Compression Devices, Enoxaparin   GI Prophylaxis: PPI     Lines/ tubes/ drains:  - ETT  - Arterial line  - Brown  - Penrose x4     Disposition:  - SICU    Patient seen and discussed with staff.    Kp De Santiago MD  PGY-1    ====================================    TODAY'S SUBJECTIVE/INTERVAL HISTORY:   No acute events. Unable to perform full history due to intubation and sedation.     OBJECTIVE:     Temp:  [97  F (36.1  C)-99.9  F (37.7  C)] 97  F (36.1  C)  Pulse:  [] 81  Resp:  [10-34] 12  BP:  ()/() 143/75  MAP:  [84 mmHg-103 mmHg] 101 mmHg  Arterial Line BP: (140-167)/(60-77) 167/74  FiO2 (%):  [33 %-45 %] 33 %  SpO2:  [96 %-100 %] 100 %  Vent Mode: CMV/AC  (Continuous Mandatory Ventilation/ Assist Control)  FiO2 (%): 33 %  Resp Rate (Set): 16 breaths/min  Tidal Volume (Set, mL): 600 mL  PEEP (cm H2O): 5 cmH2O  Inspiratory Pressure (cm H2O) (Drager Joyce): 1  Resp: 12      I/O last 3 completed shifts:  In: 3123.33 [I.V.:3123.33]  Out: 850 [Urine:820; Blood:30]    General: intubated, sedated, does not arouse to voice  HEENT:fluffs over left neck, penrose drains in place with serosanguinous output  Pulm/Resp: mechanically ventilated  CV: Regular rate  Abdomen: Soft, non-distended, non-tender  :  brwon catheter in place, urine yellow and clear    LABS:   Arterial Blood Gases   No lab results found in last 7 days.  Complete Blood Count   Recent Labs   Lab 06/04/24  0629 06/03/24  1822   WBC 13.9* 18.6*   HGB 10.2* 12.4*    271     Basic Metabolic Panel  Recent Labs   Lab 06/04/24  0629 06/04/24  0628 06/03/24  1822     --  135   POTASSIUM 4.0  --  3.9   CHLORIDE 104  --  99   CO2 22  --  25   BUN 14.0  --  12.9   CR 1.18*  --  1.29*   * 129* 122*     Liver Function Tests  No lab results found in last 7 days.  Pancreatic Enzymes  No lab results found in last 7 days.  Coagulation Profile  No lab results found in last 7 days.      IMAGING:   Recent Results (from the past 24 hour(s))   Soft tissue neck CT w contrast    Narrative    EXAM: CT SOFT TISSUE NECK W CONTRAST  LOCATION: [facility]  DATE: 6/3/2024    INDICATION: Large left sided facial swelling, likely dental abscess of left wisdom tooth.  COMPARISON: None.  CONTRAST: ISOVUE 370 90 mL  TECHNIQUE: Routine CT Soft Tissue Neck with IV contrast. Multiplanar reformats. Dose reduction techniques were used.    FINDINGS:  MUCOSAL SPACES/SOFT TISSUES: Limited assessment of the floor of mouth and  dentition due to streak artifact from dental restorations. Unerupted bilateral third maxillary and left mandibular molars, the latter malpositioned with surrounding osseous lucency   involving the inferior alveolar ridge. This is contiguous with moderate soft tissue induration involving the floor of mouth as well as the submandibular, parapharyngeal, and  spaces on the left where there is moderate-volume soft tissue air as   well as several small locules of fluid, the latter favored to represent phlegmonous change. Mild retropharyngeal edema and thickening. Moderate thickening of the platysma muscle on the left with overlying subcutaneous fat stranding extending along the   left anterolateral neck. No discrete drainable rim-enhancing fluid collection within the neck. Mild thickening of the left aryepiglottic fold, likely reactive. Unremarkable epiglottis and infraglottic larynx. Partial effacement of the oropharyngeal and   hypopharyngeal airway due to above-described soft tissue swelling with small-volume secretions, most notably in the postcricoid hypopharynx.     LYMPH NODES: No pathologic lymph nodes by size or morphology criteria.     SALIVARY GLANDS: Heterogeneous contrast enhancement involving the left submandibular and, to a lesser extent, parotid glands, the former with mild diffuse enlargement. Findings are presumably reactive in nature. No sialolith identified. Unremarkable   right parotid and submandibular glands.    THYROID: No discrete nodule or enlargement.     VESSELS: Not well assessed due to technique.    VISUALIZED INTRACRANIAL/ORBITS/SINUSES: Within technique limitations, no acute abnormality of the visualized intracranial compartment or orbits. Mild polypoid left maxillary sinus mucosal thickening with multiple with multiple mucus retention cysts   without an air-fluid level. No mastoid effusion.    OTHER: No destructive osseous lesion. Mild to moderate multilevel cervical spondylosis.  Clear visualized lungs.      Impression    IMPRESSION:   1.  Lucency surrounding the unerupted malpositioned left third mandibular molar contiguous with moderate soft tissue inflammatory changes involving the floor mouth as well as the submandibular, , and parapharyngeal spaces. Associated scattered   foci of gas and partial effacement of the oropharyngeal/hypopharyngeal airway. Findings are likely dental in etiology and compatible with gas-forming soft tissue infection, which can be seen with cellulitis, myonecrosis, and/or necrotizing fasciitis.   Correlate clinically to exclude Obed angina. Surgical evaluation is recommended.  2.  Mild retropharyngeal soft tissue edema and thickening without rim enhancement, most compatible with retropharyngeal cellulitis/effusion.   3.  No drainable neck abscess at this time.     These findings were discussed over the phone with Dr Cody Moy on 6/3/2024 at 1955 CST.   XR Chest Port 1 View   Result Value    Radiologist flags Right mainstem bronchial intubation (AA)    Narrative    EXAM: XR CHEST PORT 1 VIEW  LOCATION: [facility]  DATE: 6/3/2024    INDICATION: post intubation  COMPARISON: None.      Impression    IMPRESSION: Endotracheal tube terminates one CM below the claudia in the right mainstem bronchus. Nasogastric tube in the stomach. No evidence for CHF or pneumonia. No pleural effusion or pneumothorax. Normal heart size.        [Critical Result: Right mainstem bronchial intubation]    Finding was identified on 6/3/2024 9:42 PM CDT.     Brenda Yang R.N. was contacted by me on 6/3/2024 9:52 PM CDT and verbalized understanding of the critical result. She reports the endotracheal tube has been retracted one CM. Recommended 2-3 CM further retraction for mid tracheal positioning. The   critical result will be communicated to the receiving care team at destination facility.

## 2024-06-04 NOTE — PHARMACY-VANCOMYCIN DOSING SERVICE
Pharmacy Vancomycin Initial Note  Date of Service Elizabeth 3, 2024  Patient's  1965  58 year old, male    Indication: Abscess and Skin and Soft Tissue Infection    Current estimated CrCl = Estimated Creatinine Clearance: 82.2 mL/min (A) (based on SCr of 1.29 mg/dL (H)).    Creatinine for last 3 days  6/3/2024:  6:22 PM Creatinine 1.29 mg/dL    Recent Vancomycin Level(s) for last 3 days  No results found for requested labs within last 3 days.      Vancomycin IV Administrations (past 72 hours)        No vancomycin orders with administrations in past 72 hours.                    Nephrotoxins and other renal medications (From now, onward)      Start     Dose/Rate Route Frequency Ordered Stop    24 2100  piperacillin-tazobactam (ZOSYN) 3.375 g vial to attach to  mL bag         3.375 g  over 30 Minutes Intravenous ONCE 24  vancomycin (VANCOCIN) 2,500 mg in 0.9% NaCl 500 mL intermittent infusion         2,500 mg  over 120 Minutes Intravenous ONCE 24              Contrast Orders - past 72 hours (72h ago, onward)      Start     Dose/Rate Route Frequency Stop    24 190  iopamidol (ISOVUE-370) solution 90 mL         90 mL Intravenous ONCE 24 190             Plan:  Give vancomycin 2500mg IV x 1 Loading dose  Re-consult pharmacy if vanco needed upon admission     Logan Franks [facility]

## 2024-06-04 NOTE — PROGRESS NOTES
Pt arrives intubated and secured @26 lip. Pt  placed on full vent settings   Vent Mode: (S) CMV/AC  (Continuous Mandatory Ventilation/ Assist Control)  FiO2 (%): 45 %  Resp Rate (Set): 16 breaths/min  Tidal Volume (Set, mL): 600 mL  PEEP (cm H2O): 5 cmH2O  Inspiratory Pressure (cm H2O) (Drager Joyce): 1  Resp: 16

## 2024-06-04 NOTE — ED NOTES
Bed: ED02  Expected date:   Expected time:   Means of arrival:   Comments:  Giovanny Forbes - intubated

## 2024-06-04 NOTE — MEDICATION SCRIBE - ADMISSION MEDICATION HISTORY
Medication Scribe Admission Medication History    Admission medication history is complete. The information provided in this note is only as accurate as the sources available at the time of the update.    Information Source(s): Patient and CareEverywhere/SureScripts via in-person    Pertinent Information: Patient only taking hydrochlorothiazide and low-dose aspirin. NKDA.    Changes made to PTA medication list:  Added: None  Deleted: None  Changed: None    Allergies reviewed with patient and updates made in EHR: yes    Medication History Completed By: Augustus Warnre 6/3/2024 8:58 PM    PTA Med List   Medication Sig Last Dose    hydrochlorothiazide (HYDRODIURIL) 25 MG tablet Take 1 tablet (25 mg) by mouth daily 6/3/2024 at AM    study - aspirin, IDS# 5943, 81 mg tablet Take 81 mg by mouth daily 6/3/2024 at AM

## 2024-06-04 NOTE — ANESTHESIA PREPROCEDURE EVALUATION
Anesthesia Pre-Procedure Evaluation    Patient: Giovanny Forbes Jr.   MRN: [id number] : 1965        Procedure : Procedure(s):  INCISION AND DRAINAGE, left sublingual, submandibular and lateral pharyngeal space abscess  EXTRACTION of #17 and others as indicated          Past Medical History:   Diagnosis Date    HTN (hypertension)       Past Surgical History:   Procedure Laterality Date    HERNIA REPAIR, INGUINAL RT/LT Bilateral     age 11      No Known Allergies   Social History     Tobacco Use    Smoking status: Never     Passive exposure: Never    Smokeless tobacco: Never   Substance Use Topics    Alcohol use: Not on file      Wt Readings from Last 1 Encounters:   24 113.4 kg (250 lb)        Anesthesia Evaluation            ROS/MED HX  ENT/Pulmonary:    (-) tobacco use, asthma and COPD   Neurologic:    (-) no seizures, no CVA and no TIA   Cardiovascular:     (+)  hypertension- -   -  - -                                   (-) CAD, arrhythmias and stent   METS/Exercise Tolerance:     Hematologic:       Musculoskeletal:       GI/Hepatic:    (-) GERD and liver disease   Renal/Genitourinary:    (-) renal disease   Endo:    (-) Type II DM and thyroid disease   Psychiatric/Substance Use:       Infectious Disease:       Malignancy:       Other:            Physical Exam    Airway   unable to assess          Respiratory Devices and Support    ETT:      Dental    unable to assess        Cardiovascular          Rhythm and rate: regular and normal     Pulmonary                   OUTSIDE LABS:  CBC:   Lab Results   Component Value Date    WBC 18.6 (H) 2024    WBC 5.3 2024    HGB 12.4 (L) 2024    HGB 12.8 (L) 2024    HCT 37.7 (L) 2024    HCT 39.7 (L) 2024     2024     2024     BMP:   Lab Results   Component Value Date     2024     (L) 2024    POTASSIUM 3.9 2024    POTASSIUM 4.3 2024    CHLORIDE 99 2024    CHLORIDE  "104 04/30/2024    CO2 25 06/03/2024    CO2 19 (L) 04/30/2024    BUN 12.9 06/03/2024    BUN 13.1 04/30/2024    CR 1.29 (H) 06/03/2024    CR 1.14 04/30/2024     (H) 06/03/2024    GLC 85 04/30/2024     COAGS: No results found for: \"PTT\", \"INR\", \"FIBR\"  POC: No results found for: \"BGM\", \"HCG\", \"HCGS\"  HEPATIC:   Lab Results   Component Value Date    ALBUMIN 4.3 04/30/2024    PROTTOTAL 7.6 04/30/2024    ALT 20 04/30/2024    AST 22 04/30/2024    ALKPHOS 74 04/30/2024    BILITOTAL 0.5 04/30/2024     OTHER:   Lab Results   Component Value Date    LACT 1.3 06/03/2024    JAYLEN 10.2 06/03/2024       Anesthesia Plan    ASA Status:  5, emergent    NPO Status:  NPO Appropriate    Anesthesia Type: General.     - Airway: ETT   Induction: Inhalation.   Maintenance: Inhalation.   Techniques and Equipment:     - Lines/Monitors: 2nd IV, Arterial Line     - Drips/Meds: Norepi, Phenylephrine     Consents            Postoperative Care    Pain management: IV analgesics.   PONV prophylaxis: Dexamethasone or Solumedrol, Ondansetron (or other 5HT-3)     Comments:    Other Comments: Patient in septic shock from oral necrotizing soft tissue infection; on norepinephrine and intubated in ER; will place arterial line intraoperatively and additional IV access (if needed) for resuscitation; continue antibiotic regimen and proceed to OR for drainage and definitive surgical treatment           Sharif Cr MD    I have reviewed the pertinent notes and labs in the chart from the past 30 days and (re)examined the patient.  Any updates or changes from those notes are reflected in this note.     # Hyponatremia: Lowest Na = 135 mmol/L in last 30 days, will monitor as appropriate  # Hypercalcemia: Highest Ca = 10.2 mg/dL in last 2 days, will monitor as appropriate        # Drug Induced Platelet Defect: home medication list includes an antiplatelet medication  # Obesity: Estimated body mass index is 31.67 kg/m  as calculated from the following:    " "Height as of an earlier encounter on 6/3/24: 1.892 m (6' 2.5\").    Weight as of an earlier encounter on 6/3/24: 113.4 kg (250 lb).      "

## 2024-06-04 NOTE — PROVIDER NOTIFICATION
Brief OMFS Note     Patient is s/p incision and drainage left sided submandibular, sublingual and lateral pharyngeal space abscess, extraction of #17 with placement of 4 penrose drains (3 at left neck, 1 in mouth) on 6/4/2024 (POD #0)      Recommendations  - No additional surgical interventions planned from OMFS perspective at this time  - OMFS will continue to follow  - Continue IV abx  - Cultures pending  - HOB>30  - Warm packs to face, NO ICE  - Change fluffs to neck PRN saturation, moderate bloody / purulent drainage from neck is expected  - OMFS will continue to evaluate for penrose drain removal pending clinical improvement / decreased drainage. Normally they are in place for 2-5 days.  - Resume normal oral hygiene  - Ok for soft diet from OMFS perspective  - Pain per ICU     Please contact OMFS resident on first call with questions or concerns     Kalie Sultana DDS   OMFS intern

## 2024-06-04 NOTE — ANESTHESIA PROCEDURE NOTES
Airway       Patient location during procedure: ED       Procedure Start/Stop Times: 6/3/2024 8:40 PM and 6/3/2024 8:54 PM  Staff -        Anesthesiologist:  Federico Szymanski MD       CRNA: Federico Arnold APRN CRNA       Performed By: anesthesiologist  Consent for Airway        Urgency: emergent       Consent: No emergent situation.       Consent given by: patient       Risks and benefits: risks, benefits and alternatives were discussed       Patient identity confirmed: verbally with patient  Report Obtained from Primary Care Team       History regarding stroke/CVA obtained:Yes       History regarding presence/absence of NM disorder: YesIndications and Patient Condition       Indications for airway management: airway protection       Mallampati: III     Induction type:RSI       Mask difficulty assessment: 0 - not attempted    Final Airway Details       Final airway type: endotracheal airway       Successful airway: ETT - single  Endotracheal Airway Details        ETT size (mm): 8.0       Successful intubation technique: video laryngoscopy       VL Blade Size: Glidescope 4       Grade View of Cords: 2       Adjucts: stylet and exchange catheter       Position: Right       Measured from: lips       Secured at (cm): 24    Post intubation assessment        Placement verified by: capnometry, equal breath sounds and chest rise        Number of attempts at approach: 2       Number of other approaches attempted: 1       Secured with: commercial tube faith       Ease of procedure: difficult (Secondary to swelling)    Medication(s) Administered   Medication Administration Time: 6/3/2024 8:40 PM    Additional Comments       Called to ED pt with active increasing neck swelling and concern of loss of airway. Attempted DL with zero view. GVL4 blade used and ETT passed but cuff injury and new ETT passed over bougie. +BBS, +Pal chest rise, +CO2, CXR pending

## 2024-06-04 NOTE — PHARMACY-ADMISSION MEDICATION HISTORY
Pharmacist Admission Medication History    Admission medication history is complete. The information provided in this note is only as accurate as the sources available at the time of the update.    Information Source(s): Family member and CareEverywhere/SureScripts via phone    Pertinent Information: Patient is currently intubated and medication history completed through speaking with his spouse, Chris, on the phone. Patient's home aspirin 81 mg was inadvertently entered as the IDS #5943 version; verified with spouse and CE that this is not the case.    Changes made to PTA medication list:  Added:   Aspirin 81 mg tablet  Deleted:   IDS #5943 aspirin 81 mg tablet: wrong entry  Changed: None    Allergies reviewed with patient and updates made in EHR: yes    Medication History Completed By: Alfredo Tolbert RPH 6/4/2024 10:44 AM    PTA Med List   Medication Sig Last Dose    aspirin 81 MG EC tablet Take 81 mg by mouth daily     hydrochlorothiazide (HYDRODIURIL) 25 MG tablet Take 1 tablet (25 mg) by mouth daily 6/3/2024

## 2024-06-04 NOTE — SEDATION DOCUMENTATION
Pt on EMS stretcher, ready to transfer to the , hooked up to EMS monitoring, report hand-off given by Brenda BOSWELL RN.

## 2024-06-04 NOTE — CONSULTS
"  ORAL & MAXILLOFACIAL SURGERY (OMFS)   CONSULT    Patient: Giovanny Forbes Jr.  : 1965  MRN: [id number]  Date of Admission: 6/3/2024  Requesting Provider: Shamir Light    OMFS consulted regarding odontogenic infection concerning for necrotizing soft tissue infection.      Assessment   58 year old male with PMH significant for HTN who was intubated at ED [facility] and was transferred from ED [facility] to [facility], presents with left submandibular, sublingual and lateral pharyngeal space abscess associated with impacted #17       Plan   - Plan for I&D of the left submandibular, sublingual and lateral pharyngeal space abscess via transcervical approach and extraction of #17 and any other teeth as indicated with placement of penrose drains in the operating room STAT   - Admit to ICU following surgery, plan for patient to stay intubated after surgery    Thank you for this consult. Please contact the OMFS resident on-call with questions or concerns.    Discussed with Chief Resident, Dr. Sherita Knowles, who discussed with Staff. Dr. Radha Townsend.    Kalie Sultana DDS   Oral & Maxillofacial Surgery, Intern  Pager: [phone]    ___________________________________________________________________        Chief complaint   \"He started complaining about his swelling last Friday when he was in [place]\"       History of present illness   58 year old male with PMH significant for HTN presents with a 4-day history of left sided facial swelling. Interview was done with his wife since patient is being intubated. Swelling started since Friday when he was in [place]. Patient came back to [facility] this morning and presented to his dentist who advised him to present to the ED. He presented to the ED [facility] with complaint of trismus and difficulty with swallowing. His wife reports intermittent low-grade fevers since Friday. When evaluated at ED [facility], patient was intubated with concerning of airway " obstruction and necrotizing soft tissue infection shown in CT scan. Patient was then transferred to [facility] for further evaluation with OMFS. At bedside, his wife reported that patient had a protein drink at 6:30 pm today and that was the last thing he had.          Past medical history     Past Medical History:   Diagnosis Date    HTN (hypertension)        Past surgical history     Past Surgical History:   Procedure Laterality Date    HERNIA REPAIR, INGUINAL RT/LT Bilateral     age 11       PTA medications     Current Facility-Administered Medications   Medication Dose Route Frequency Provider Last Rate Last Admin    fentaNYL (SUBLIMAZE) infusion   mcg/hr Intravenous Continuous Shamir Light DO 1 mL/hr at 06/03/24 2322 50 mcg/hr at 06/03/24 2322    norepinephrine (LEVOPHED) 4 mg/250 mL NS infusion ADULT (PERIPHERAL)  0.03-0.125 mcg/kg/min (Dosing Weight) Intravenous Continuous Shamir Light DO 12.7 mL/hr at 06/03/24 2343 0.03 mcg/kg/min at 06/03/24 2343    pharmacy alert - intermittent dosing  1 each Other See Admin Instructions Shamir Light DO        propofol (DIPRIVAN) infusion  5-75 mcg/kg/min (Dosing Weight) Intravenous Continuous Shamir Light DO 40.7 mL/hr at 06/03/24 2325 60 mcg/kg/min at 06/03/24 2325    [START ON 6/4/2024] vancomycin (VANCOCIN) 2,000 mg in sodium chloride 0.9 % 500 mL intermittent infusion  2,000 mg Intravenous Q24H Shamir Light DO         Current Outpatient Medications   Medication Sig Dispense Refill    hydrochlorothiazide (HYDRODIURIL) 25 MG tablet Take 1 tablet (25 mg) by mouth daily 90 tablet 3    study - aspirin, IDS# [id number], 81 mg tablet Take 81 mg by mouth daily                    Allergies   No Known Allergies     Family history     Family History   Problem Relation Age of Onset    Hypertension Mother     Hypertension Father     Myocardial Infarction Father 42    Asthma Maternal Grandmother     Diabetes Type 2  Maternal  Grandmother        Social history     Social History     Socioeconomic History    Marital status:      Spouse name: Not on file    Number of children: Not on file    Years of education: Not on file    Highest education level: Not on file   Occupational History    Not on file   Tobacco Use    Smoking status: Never     Passive exposure: Never    Smokeless tobacco: Never   Vaping Use    Vaping status: Never Used   Substance and Sexual Activity    Alcohol use: Not on file    Drug use: Not on file    Sexual activity: Not on file   Other Topics Concern    Not on file   Social History Narrative    Not on file     Social Determinants of Health     Financial Resource Strain: High Risk (12/31/2021)    [facility], [facility]    Financial Resource Strain     Difficulty of Paying Living Expenses: Not on file     Difficulty of Paying Living Expenses: Not on file   Food Insecurity: Not on file   Transportation Needs: Not on file   Physical Activity: Not on file   Stress: Not on file   Social Connections: Unknown (12/31/2021)    [facility], [facility]    Social Connections     Frequency of Communication with Friends and Family: Not on file   Interpersonal Safety: Low Risk  (4/30/2024)    Interpersonal Safety     Do you feel physically and emotionally safe where you currently live?: Yes     Within the past 12 months, have you been hit, slapped, kicked or otherwise physically hurt by someone?: No     Within the past 12 months, have you been humiliated or emotionally abused in other ways by your partner or ex-partner?: No   Housing Stability: Not on file       Review of systems   10 point ROS reviewed and negative aside from listed in HPI       Objective/Physical examination   Vitals: Blood pressure 135/76, pulse 74, temperature 98.6  F (37  C), resp. rate 16, SpO2  100%.    GEN: WD/WN male, intubated and sedated, lying in bed   HEENT: left sided facial edema with fluctuation and induration, the inferior border of the left mandible is not palpable, no eyrthema or active purulence   I/O: writer was not able to fully evaluate intraorally since patient is intubated and sedated, vestibular edema noted at left mandible, tooth #17 is not clinically visible, no obvious active purulence in mouth, TRISTEN and uvula and FOM not able to evaluate due to being intubated and sedated   CV: RRR, warm and well-perfused  PULM: breathing comfortably on room air  GI: Soft, ND/NT  MSK: WALLACE, no peripheral extremity edema  NEURO: AAOx4, CN II-XII intact bilaterally  PSYCH: Appropriate mood and affect     Laboratory, pathology and radiology data     Lab results:   CBC RESULTS:   Recent Labs   Lab Test 06/03/24  1822   WBC 18.6*   RBC 4.48   HGB 12.4*   HCT 37.7*   MCV 84   MCH 27.7   MCHC 32.9   RDW 14.1          Last Basic Metabolic Panel:  Lab Results   Component Value Date     06/03/2024     01/20/2020      Lab Results   Component Value Date    POTASSIUM 3.9 06/03/2024    POTASSIUM 4.0 01/20/2020     Lab Results   Component Value Date    CHLORIDE 99 06/03/2024    CHLORIDE 103 01/20/2020     Lab Results   Component Value Date    JAYLEN 10.2 06/03/2024    JAYLEN 9.9 01/20/2020     Lab Results   Component Value Date    CO2 25 06/03/2024    CO2 30 01/20/2020     Lab Results   Component Value Date    BUN 12.9 06/03/2024    BUN 28 01/20/2020     Lab Results   Component Value Date    CR 1.29 06/03/2024    CR 1.37 01/20/2020     Lab Results   Component Value Date     06/03/2024     01/20/2020       IMAGING RESULTS (Include outside hospital results)     Independently reviewed - left sided submandibular, sublingual and lateral pharyngeal space abscess with presence of gas associated with impacted #17     Recent Results (from the past 48 hour(s))   Soft tissue neck CT w contrast     Narrative    EXAM: CT SOFT TISSUE NECK W CONTRAST  LOCATION: [facility]  DATE: 6/3/2024    INDICATION: Large left sided facial swelling, likely dental abscess of left wisdom tooth.  COMPARISON: None.  CONTRAST: ISOVUE 370 90 mL  TECHNIQUE: Routine CT Soft Tissue Neck with IV contrast. Multiplanar reformats. Dose reduction techniques were used.    FINDINGS:  MUCOSAL SPACES/SOFT TISSUES: Limited assessment of the floor of mouth and dentition due to streak artifact from dental restorations. Unerupted bilateral third maxillary and left mandibular molars, the latter malpositioned with surrounding osseous lucency   involving the inferior alveolar ridge. This is contiguous with moderate soft tissue induration involving the floor of mouth as well as the submandibular, parapharyngeal, and  spaces on the left where there is moderate-volume soft tissue air as   well as several small locules of fluid, the latter favored to represent phlegmonous change. Mild retropharyngeal edema and thickening. Moderate thickening of the platysma muscle on the left with overlying subcutaneous fat stranding extending along the   left anterolateral neck. No discrete drainable rim-enhancing fluid collection within the neck. Mild thickening of the left aryepiglottic fold, likely reactive. Unremarkable epiglottis and infraglottic larynx. Partial effacement of the oropharyngeal and   hypopharyngeal airway due to above-described soft tissue swelling with small-volume secretions, most notably in the postcricoid hypopharynx.     LYMPH NODES: No pathologic lymph nodes by size or morphology criteria.     SALIVARY GLANDS: Heterogeneous contrast enhancement involving the left submandibular and, to a lesser extent, parotid glands, the former with mild diffuse enlargement. Findings are presumably reactive in nature. No sialolith identified. Unremarkable   right parotid and submandibular glands.    THYROID: No discrete  nodule or enlargement.     VESSELS: Not well assessed due to technique.    VISUALIZED INTRACRANIAL/ORBITS/SINUSES: Within technique limitations, no acute abnormality of the visualized intracranial compartment or orbits. Mild polypoid left maxillary sinus mucosal thickening with multiple with multiple mucus retention cysts   without an air-fluid level. No mastoid effusion.    OTHER: No destructive osseous lesion. Mild to moderate multilevel cervical spondylosis. Clear visualized lungs.      Impression    IMPRESSION:   1.  Lucency surrounding the unerupted malpositioned left third mandibular molar contiguous with moderate soft tissue inflammatory changes involving the floor mouth as well as the submandibular, , and parapharyngeal spaces. Associated scattered   foci of gas and partial effacement of the oropharyngeal/hypopharyngeal airway. Findings are likely dental in etiology and compatible with gas-forming soft tissue infection, which can be seen with cellulitis, myonecrosis, and/or necrotizing fasciitis.   Correlate clinically to exclude Obed angina. Surgical evaluation is recommended.  2.  Mild retropharyngeal soft tissue edema and thickening without rim enhancement, most compatible with retropharyngeal cellulitis/effusion.   3.  No drainable neck abscess at this time.     These findings were discussed over the phone with Dr Cody Moy on 6/3/2024 at 1955 CST.

## 2024-06-04 NOTE — CONSULTS
SURGICAL ICU ADMISSION NOTE  06/04/2024      PRIMARY TEAM: OMFS  PRIMARY PHYSICIAN: Dr. Townsend  REASON FOR CRITICAL CARE ADMISSION: Mechanical ventilation   ADMITTING PHYSICIAN: Dr. Burrows  Date of Service (when I saw the patient): 06/04/2024    ASSESSMENT:  Giovanny Forbes Jr. is a 58 year old male with a history of HTN who presented to OSH ED with left sided facial swelling and fevers and imaging concerning for NSTI. He was intubated at OSH for airway protection and transferred to [facility] for further management. He is now s/p incision and drainage of left sided submandibular, sublingual and lateral pharyngeal space abscess, extraction of #17 with placement of 4 penrose drains (3 at left neck, 1 in mouth) on 6/4/202  Remained intubated postoperatively and admitted to SICU.    PLAN:    Neurological:  # Acute postoperative pain   - Monitor neurological status. Delirium preventions and precautions.   - Pain: fentanyl gtt    - Sedation plan: Precedex gtt     HEENT:  #s/p incision and drainage left sided submandibular, sublingual and lateral pharyngeal space abscess, extraction of #17 with placement of 4 penrose drains (3 at left neck, 1 in mouth) on 6/4/2024  - HOB>30  - Warm packs to face, NO ICE  - Change fluffs to neck PRN saturation, moderate bloody / purulent drainage from neck is expected  - OMFS will continue to evaluate for penrose drain removal pending clinical improvement / decreased drainage. Normally they are in place for 2-5 days.  - Resume normal oral hygiene    Pulmonary:   # Post operative ventilatory support  - VENT: , RR 16, FiO2 40%, Peep 5. Continue full vent support. PST when meets criteria.  Ventilatory bundle. HOB elevation   - CXR to confirm ETT position   - Supplemental oxygen to keep saturation above 92 %.  - Per OMFS attending note, would not plan for extubation today given severe infection and airway deviation  - 24 hours of decadron    Cardiovascular:    # Hx HTN  #  Shock-distributive   - Monitor hemodynamic status. No pressor requirements on arrival to SICU.   - Hold PTA HCTZ    Gastroenterology/Nutrition:  # Protein calorie deficit malnutrition   - NPO while intubated  - OGT for medications. AXR to confirm placement.  - Ok for soft diet from OMFS perspective once extubated     Fluids/Electrolytes/Renal:   # DOUG  - Creatinine 1.29 on arrival  - LR @ 100 for IV fluid hydration  - Garg in place.     Endocrine:  # Stress hyperglycemia   # Steroid induced hyperglycemia   - Sliding scale for glucose management. Goal to keep BG< 180 for optimal wound healing     ID:  # Leukocytosis   # NSTI  - Leukocytosis to 18.6 on presentation  - Repeat CBC   - Continue clindamycin, zosyn, vancomycin    Positive cultures:  - N/A    6/4 OR cultures pending  6/3 blood cultures pending    Heme:     # Acute blood loss anemia   - Preop hemoglobin 12.4  - Transfuse if hgb <7.0 or signs/symptoms of hypoperfusion. Monitor and trend.     Musculoskeletal:  # Weakness and deconditioning of critical illness   - Physical and occupational therapy consult       General Cares/Prophylaxis:    DVT Prophylaxis: Pneumatic Compression Devices  GI Prophylaxis: PPI    Lines/ tubes/ drains:  - ETT  - Arterial line  - Garg  - Penrose x4    Disposition:  - SICU    Patient seen, findings and plan discussed with surgical ICU staff, Dr. Burrows.    Kathie Chilel, DO  PGY-2 General Surgery  - - - - - - - - - - - - - - - - - - - - - - - - - - - - - - - - - - - - - - - - - - - - - -   HISTORY PRESENTING ILLNESS:   Mr. Forbes is 58 year old male with PMH significant for HTN who presented to OSH with 4 days of left sided facial swelling. He went to the dentist today for evaluation and his dentist recommended coming to the ED. Also reportedly had intermittent fevers since 5/31. When evaluated at ED [facility], patient was intubated with concern for airway obstruction and necrotizing soft tissue infection on CT scan. He was  transferred to [facility] for further care.    REVIEW OF SYSTEMS: 10 point ROS neg other than the symptoms noted above in the HPI.    PAST MEDICAL HISTORY:   Past Medical History:   Diagnosis Date    HTN (hypertension)        SURGICAL HISTORY:   Past Surgical History:   Procedure Laterality Date    HERNIA REPAIR, INGUINAL RT/LT Bilateral     age 11       SOCIAL HISTORY:   Social History     Socioeconomic History    Marital status:    Tobacco Use    Smoking status: Never     Passive exposure: Never    Smokeless tobacco: Never   Vaping Use    Vaping status: Never Used     Social Determinants of Health      [facility] [facility], [employer] [facility]    Financial Resource Strain    [facility] [facility], [employer] [facility]    Social Connections   Interpersonal Safety: Low Risk  (4/30/2024)    Interpersonal Safety     Do you feel physically and emotionally safe where you currently live?: Yes     Within the past 12 months, have you been hit, slapped, kicked or otherwise physically hurt by someone?: No     Within the past 12 months, have you been humiliated or emotionally abused in other ways by your partner or ex-partner?: No       ALLERGIES:   No Known Allergies    MEDICATIONS:  Current Facility-Administered Medications   Medication Dose Route Frequency Provider Last Rate Last Admin    fentaNYL (SUBLIMAZE) infusion   mcg/hr Intravenous Continuous Shamir Light DO   Stopped at 06/04/24 0130    norepinephrine (LEVOPHED) 4 mg/250 mL NS infusion ADULT (PERIPHERAL)  0.03-0.125 mcg/kg/min (Dosing Weight) Intravenous Continuous Shamir Light DO   Stopped at 06/04/24 0222    [Auto Hold] pharmacy alert - intermittent dosing  1 each Other See Admin Instructions Shamir Light DO        propofol (DIPRIVAN) infusion  5-75 mcg/kg/min (Dosing Weight) Intravenous Continuous Kuldeep  Shamir Cotto DO 1,084.8 mL/hr at 06/04/24 0221 1,600 mcg/kg/min at 06/04/24 0221    [Auto Hold] vancomycin (VANCOCIN) 2,000 mg in sodium chloride 0.9 % 500 mL intermittent infusion  2,000 mg Intravenous Q24H Shamir Light DO         Facility-Administered Medications Ordered in Other Encounters   Medication Dose Route Frequency Provider Last Rate Last Admin    dexAMETHasone (DECADRON) injection   Intravenous PRN Rd, Velma Aylin, APRN CRNA   10 mg at 06/04/24 0212    fentaNYL (PF) (SUBLIMAZE) injection   Intravenous PRN Rd, Velma Aylin, APRN CRNA   100 mcg at 06/04/24 0227    HYDROmorphone (DILAUDID) injection   Intravenous PRN Rd, Velma Aylin, APRN CRNA   0.5 mg at 06/04/24 0233    rocuronium injection   Intravenous PRN Rd, Velma Aylin, APRN CRNA   40 mg at 06/04/24 0158       PHYSICAL EXAMINATION:  Temp:  [97.3  F (36.3  C)-99.9  F (37.7  C)] 97.7  F (36.5  C)  Pulse:  [] 70  Resp:  [10-34] 16  BP: ()/() 121/72  FiO2 (%):  [35 %-45 %] 35 %  SpO2:  [96 %-100 %] 100 %  General: intubated, sedated, does not arouse to voice  HEENT:fluffs over left neck, penrose drains in place with serosanguinous output  Pulm/Resp: mechanically ventilated  CV: Regular rate  Abdomen: Soft, non-distended, non-tender  :  brown catheter in place, urine yellow and clear    LABS: Reviewed.   Arterial Blood Gases   No lab results found in last 7 days.  Complete Blood Count   Recent Labs   Lab 06/03/24  1822   WBC 18.6*   HGB 12.4*        Basic Metabolic Panel  Recent Labs   Lab 06/03/24  1822      POTASSIUM 3.9   CHLORIDE 99   CO2 25   BUN 12.9   CR 1.29*   *     Liver Function Tests  No lab results found in last 7 days.  Pancreatic Enzymes  No lab results found in last 7 days.  Coagulation Profile  No lab results found in last 7 days.    IMAGING:  Recent Results (from the past 24 hour(s))   Soft tissue neck CT w contrast    Narrative    EXAM: CT SOFT TISSUE NECK  W CONTRAST  LOCATION: [facility]  DATE: 6/3/2024    INDICATION: Large left sided facial swelling, likely dental abscess of left wisdom tooth.  COMPARISON: None.  CONTRAST: ISOVUE 370 90 mL  TECHNIQUE: Routine CT Soft Tissue Neck with IV contrast. Multiplanar reformats. Dose reduction techniques were used.    FINDINGS:  MUCOSAL SPACES/SOFT TISSUES: Limited assessment of the floor of mouth and dentition due to streak artifact from dental restorations. Unerupted bilateral third maxillary and left mandibular molars, the latter malpositioned with surrounding osseous lucency   involving the inferior alveolar ridge. This is contiguous with moderate soft tissue induration involving the floor of mouth as well as the submandibular, parapharyngeal, and  spaces on the left where there is moderate-volume soft tissue air as   well as several small locules of fluid, the latter favored to represent phlegmonous change. Mild retropharyngeal edema and thickening. Moderate thickening of the platysma muscle on the left with overlying subcutaneous fat stranding extending along the   left anterolateral neck. No discrete drainable rim-enhancing fluid collection within the neck. Mild thickening of the left aryepiglottic fold, likely reactive. Unremarkable epiglottis and infraglottic larynx. Partial effacement of the oropharyngeal and   hypopharyngeal airway due to above-described soft tissue swelling with small-volume secretions, most notably in the postcricoid hypopharynx.     LYMPH NODES: No pathologic lymph nodes by size or morphology criteria.     SALIVARY GLANDS: Heterogeneous contrast enhancement involving the left submandibular and, to a lesser extent, parotid glands, the former with mild diffuse enlargement. Findings are presumably reactive in nature. No sialolith identified. Unremarkable   right parotid and submandibular glands.    THYROID: No discrete nodule or enlargement.     VESSELS: Not well  assessed due to technique.    VISUALIZED INTRACRANIAL/ORBITS/SINUSES: Within technique limitations, no acute abnormality of the visualized intracranial compartment or orbits. Mild polypoid left maxillary sinus mucosal thickening with multiple with multiple mucus retention cysts   without an air-fluid level. No mastoid effusion.    OTHER: No destructive osseous lesion. Mild to moderate multilevel cervical spondylosis. Clear visualized lungs.      Impression    IMPRESSION:   1.  Lucency surrounding the unerupted malpositioned left third mandibular molar contiguous with moderate soft tissue inflammatory changes involving the floor mouth as well as the submandibular, , and parapharyngeal spaces. Associated scattered   foci of gas and partial effacement of the oropharyngeal/hypopharyngeal airway. Findings are likely dental in etiology and compatible with gas-forming soft tissue infection, which can be seen with cellulitis, myonecrosis, and/or necrotizing fasciitis.   Correlate clinically to exclude Obed angina. Surgical evaluation is recommended.  2.  Mild retropharyngeal soft tissue edema and thickening without rim enhancement, most compatible with retropharyngeal cellulitis/effusion.   3.  No drainable neck abscess at this time.     These findings were discussed over the phone with Dr Cody Moy on 6/3/2024 at 1955 CST.   XR Chest Port 1 View   Result Value    Radiologist flags Right mainstem bronchial intubation (AA)    Narrative    EXAM: XR CHEST PORT 1 VIEW  LOCATION: [facility]  DATE: 6/3/2024    INDICATION: post intubation  COMPARISON: None.      Impression    IMPRESSION: Endotracheal tube terminates one CM below the claudia in the right mainstem bronchus. Nasogastric tube in the stomach. No evidence for CHF or pneumonia. No pleural effusion or pneumothorax. Normal heart size.        [Critical Result: Right mainstem bronchial intubation]    Finding was identified on  6/3/2024 9:42 PM CDT.     Brenda Yang R.N. was contacted by me on 6/3/2024 9:52 PM CDT and verbalized understanding of the critical result. She reports the endotracheal tube has been retracted one CM. Recommended 2-3 CM further retraction for mid tracheal positioning. The   critical result will be communicated to the receiving care team at destination facility.

## 2024-06-04 NOTE — ED TRIAGE NOTES
Patient brought in from [facility] intubated and sedated for airway protection due to necrotizing fascitis.     200mcg of Fentanyl given en route  Propofol running 60mcg/kg/min  Levo at 0.1mcg/min  Vancomycin running

## 2024-06-04 NOTE — PHARMACY-VANCOMYCIN DOSING SERVICE
Pharmacy Vancomycin Initial Note  Date of Service Elizabeth 3, 2024  Patient's  1965  58 year old, male    Indication: Sepsis and Skin and Soft Tissue Infection    Current estimated CrCl = Estimated Creatinine Clearance: 82.2 mL/min (A) (based on SCr of 1.29 mg/dL (H)).    Creatinine for last 3 days  6/3/2024:  6:22 PM Creatinine 1.29 mg/dL    Recent Vancomycin Level(s) for last 3 days  No results found for requested labs within last 3 days.      Vancomycin IV Administrations (past 72 hours)                     vancomycin (VANCOCIN) 2,500 mg in 0.9% NaCl 500 mL intermittent infusion (mg) 2,500 mg New Bag 24                    Nephrotoxins and other renal medications (From now, onward)      Start     Dose/Rate Route Frequency Ordered Stop    24 223  norepinephrine (LEVOPHED) 4 mg/250 mL NS infusion ADULT (PERIPHERAL)         0.03-0.125 mcg/kg/min × 113 kg (Dosing Weight)  12.7-53 mL/hr  Intravenous CONTINUOUS 24 221              Contrast Orders - past 72 hours (72h ago, onward)      None            Insight[employer] Prediction of Planned Initial Vancomycin Regimen  Loading dose: 2500 mg x1  Regimen: 2000 mg IV every 24 hours.  Start time: 09:23 on 2024  Exposure target: AUC24 (range)400-600 mg/L.hr   AUC24,ss: 522 mg/L.hr  Probability of AUC24 > 400: 78 %  Ctrough,ss: 14.8 mg/L  Probability of Ctrough,ss > 20: 26 %  Probability of nephrotoxicity (Lodise ALFREDO ): 10 %          Plan:  Start vancomycin  2500 mg x1. Then 2000 mg IV q24h.   Vancomycin monitoring method: AUC  Vancomycin therapeutic monitoring goal: 400-600 mg*h/L  Pharmacy will check vancomycin levels as appropriate in 1-3 Days.    Serum creatinine levels will be ordered daily for the first week of therapy and at least twice weekly for subsequent weeks.      Analia Lozano, PharmD, BCEMP, BCPS

## 2024-06-04 NOTE — OP NOTE
Oral and Maxillofacial Surgery  Operative Note       Preoperative Diagnosis  Submandibular abscess [K12.2]  Sublingual abscess [K12.2]  Abscess of lateral pharyngeal space [J39.0]    Postoperative Diagnosis  Same    Procedure(s):  Incision and drainage left submandibular, sublingual,  and lateral pharyngeal spaces  Extraction tooth #17    Surgeon:  Radha Townsend DDS, FACS      Resident Surgeon(s):  Sherita Knowles DDS    Resident Assistants:  Kalie Sultana DDS    Other surgical staff (if any):  Circulator: Porter Delvalle RN  Scrub Person: Rupal Ramos    Anesthesia  Anesthesiologist: Sharif Cr MD  CRNA: Velma Sultana APRN CRNA    EBL:   20 mL    Drains: None    Prosthetic Devices:   * No implants in log *    Specimen Removed:   ID Type Source Tests Collected by Time Destination   A : Left submandibular space. Abscess Neck ANAEROBIC BACTERIAL CULTURE ROUTINE, GRAM STAIN, FUNGAL OR YEAST CULTURE ROUTINE, AEROBIC BACTERIAL CULTURE ROUTINE Radha Townsend DDS 6/4/2024  2:22 AM        Complications: none    Indications for Surgery:   Based on clinical and radiographic findings, the patient needed emergent incision and drainage of the left submandibular, sublingual,  and lateral pharyngeal spaces and extraction of tooth #17 in the OR setting, due to severe infection and loss of airway, patient intubated before arrival at [facility] due to difficulty breathing. The procedure, benefits, risks, alternatives including no treatment were discussed in detail with the patient and the patient elected to proceed with the planned surgery.     Procedure description:   On the day of surgery, the patient was seen by myself and Dr. Townsend in the pre-op holding area.  The procedure, benefits, risks, alternatives including no treatment were discussed again with the patient and an informed written consent was obtained for the planned procedure.  Patient was transported to the operating  room and transferred to the OR table in a supine position.  Airway previously secured prior to arrival with oral ETT intubation at outside hospital.  Throat pack placed, oral cavity cleansed with chlorhexidine mouth rinse, 10cc 0.25%marcaine with epinephrine was used for infiltration of the left angle and [place] and long buccal block completed. The patient was prepped and draped in a sterile fashion for the planned procedure.  Surgeons scrubbed and donned sterile attire. A surgical timeout was performed by all members of the team.     Attention turned to left submandibular area, left anterior mandible and left angle were marked and estimated inferior border of mandible marked, 2 cm below this line an incision line was marked and 0.25% marcaine with epinephrine was used to infiltrate area of incision. An 18 gauge needle was used to aspirate abscess, scant amount of purulence, 15 blade was used to create an incision through skin and subcutaneous tissue, curved Eliza used for blunt dissection to inferior border of mandible, inferior border of mandible was striped with curved Eliza, and dissection on medial and lateral of mandible was completed. Copious amounts of purulent drainage was appreciated, area was irrigated with copious amounts of sterile saline, until drainage ran clear.     Attention turned intraorally, bite block placed on right and sulcular incision around teeth #18 and 19 completed with discontinue release. FTMP flap raised with #9 periosteal, dissection into lateral pharyngeal space and lateral border of mandible completed. Hand piece with copious irrigation used to removal crestal and buccal bone, tooth #17 sectioned into several pieces, tooth #17 extracted in its entirety. Area irrigated with copious amounts of sterile saline, 1x penrose drain placed intraorally into lateral pharyngeal space, secure with 2-0 silk suture.    Attention to extraoral incision, 3x penrose drains placed, longest drain  placed most posterior on lateral border of mandible, middle length drain placed to medial portion of mandibular body, shortest drain placed anteriorly on the lateral border of the mandible.    Fluffs placed with neuro netting.     Thus, the planned procedure completed, the throat pack was removed, the patient's care was given back to the anesthesia team.      I was told by the OR nurse staff that all needles, sponges, instruments were found to be correct and there were no intraoperative complications noted. Patient remained intubated, transported to PACU, then medical ICU.     Attending staff, Dr. Townsend, was present for the entire duration of the procedure.    Sherita Knowles DDS  OM Resident, PGY-4

## 2024-06-04 NOTE — ANESTHESIA CARE TRANSFER NOTE
Patient: Giovanny Forbes Jr.    Procedure: Procedure(s):  INCISION AND DRAINAGE, left sublingual, submandibular and lateral pharyngeal space abscess  EXTRACTION of tooth #17.       Diagnosis: Submandibular abscess [K12.2]  Sublingual abscess [K12.2]  Abscess of lateral pharyngeal space [J39.0]  Diagnosis Additional Information: No value filed.    Anesthesia Type:   General     Note:    Oropharynx: endotracheal tube in place and ventilatory support  Level of Consciousness: iatrogenic sedation      Independent Airway: airway patency satisfactory and stable  Dentition: dentition unchanged S/P dental procedure  Vital Signs Stable: post-procedure vital signs reviewed and stable  Report to RN Given: handoff report given  Patient transferred to: PACU  Comments: Patient brought to PACU with monitor, ambu, and propofol sedation.  Placed on vent.  Report to RN.    Handoff Report: Identifed the Patient, Identified the Reponsible Provider, Reviewed the pertinent medical history, Discussed the surgical course, Reviewed Intra-OP anesthesia mangement and issues during anesthesia, Set expectations for post-procedure period and Allowed opportunity for questions and acknowledgement of understanding      Vitals:  Vitals Value Taken Time   /93 06/04/24 0345   Temp 98.3    Pulse 83 06/04/24 0351   Resp 16 06/04/24 0351   SpO2 100 % 06/04/24 0349   Vitals shown include unfiled device data.    Electronically Signed By: Velma Sultana CRNA, APRN CRNA  June 4, 2024  3:52 AM

## 2024-06-04 NOTE — ANESTHESIA POSTPROCEDURE EVALUATION
Patient: Giovanny Forbes Jr.    Procedure: Procedure(s):  INCISION AND DRAINAGE, left sublingual, submandibular and lateral pharyngeal space abscess  EXTRACTION of tooth #17.       Anesthesia Type:  General    Note:  Disposition: Inpatient; ICU            ICU Sign Out: Unable to perform physician to physician sign out (Paged and Voicera'd Attending Staff; could not reach for sign out)   Postop Pain Control: Uneventful            Sign Out: Well controlled pain   PONV: No   Neuro/Psych: Uneventful            Sign Out: Acceptable/Baseline neuro status   Airway/Respiratory: Uneventful            Sign Out: AIRWAY IN SITU/Resp. Support   CV/Hemodynamics: Uneventful            Sign Out: Acceptable CV status; No obvious hypovolemia; No obvious fluid overload   Other NRE: NONE   DID A NON-ROUTINE EVENT OCCUR? No           Last vitals:  Vitals Value Taken Time   /75 06/04/24 0515   Temp 36.1  C (97  F) 06/04/24 0430   Pulse 77 06/04/24 0517   Resp 13 06/04/24 0517   SpO2 100 % 06/04/24 0517   Vitals shown include unfiled device data.    Electronically Signed By: Sharif Cr MD  June 4, 2024  4:46 PM

## 2024-06-04 NOTE — OR NURSING
Pt to Pacu from the OR. Pt is sedated on propofoland has an ETT in place and on ventilator. VSS. Pt stable.

## 2024-06-04 NOTE — ED PROVIDER NOTES
Emergency Department Midlevel Supervisory Note     I had a face to face encounter with this patient seen by the Advanced Practice Provider (EMELY). I personally made/approved the management plan and take responsibility for the patient management. I personally saw patient and performed a substantive portion of the visit including all aspects of the medical decision making.     ED Course:  8:08PM  Catrachito Moy PA-C staffed patient with me. I agree with their assessment and plan of management, and I will see the patient.  8:13 PM  I met with the patient to introduce myself, gather additional history, perform my initial exam, and discuss the plan.   8:27 PM  Discussed intubation with patient.    Procedures:    Critical Care     Performed by: Dr Nicholas Espinoza, Catrachito Kumar  Authorized by: Dr Nicholas Espinoza  Total critical care time: 75 minutes  Critical care was necessary to treat or prevent imminent or life-threatening deterioration of the following conditions: Necrotizing soft tissue infection with airway compromise  Critical care was time spent personally by me on the following activities: development of treatment plan with patient or surrogate, discussions with consultants, examination of patient, evaluation of patient's response to treatment, obtaining history from patient or surrogate, ordering and performing treatments and interventions, ordering and review of laboratory studies, ordering and review of radiographic studies, re-evaluation of patient's condition and monitoring for potential decompensation.  Critical care time was exclusive of separately billable procedures and treating other patients.    EKG:    Independently reviewed and interpreted by me  Performed at: 8:30 PM  Impression: Incomplete right bundle branch block, nonspecific ST changes  Rate: 103  Rhythm: Sinus  Axis: Normal  UT Interval: 166  QRS Interval: 100  QTc Interval: 440  ST Changes: Nonspecific ST change  Comparison: January  "2020, incomplete right bundle branch block is new along with nonspecific ST changes are new      MDM:  Patient with left-sided facial swelling for the last several days, on exam marked swelling of the left cheek, left side of the neck.  Hoarse voice and spitting secretions.  Labs independently interpreted by me with elevated white blood cell count, elevated CRP, creatinine normal.  CT scan demonstrates necrotizing soft tissue infection.  Patient will be started on broad-spectrum antibiotics, transfer for surgical debridement and possible need for hyperbaric therapy.  While in the emergency department, patient's swelling has increased, also voices become more hoarse and he is now having difficulty handling secretions.  Decision to intubate, due to concern for complicated airway, asked anesthesia to assist.  Anesthesia attempted direct laryngoscopy with poor visualization, glide scope was used and tube was passed, reported displacement of the airway laterally and swelling of the left side.  Initial tube was placed but cuff could not be inflated, this was changed out.  After change out, breath sounds equal bilaterally, end-tidal CO2 colorimetric change confirmed.  Lowest oxygen saturation was 98%.  Emergent transfer to [facility].      1. Facial swelling    2. Necrotizing soft tissue infection    3. Hard to intubate, initial encounter          Brief HPI:     Giovanny Forbes Jr. is a 58 year old male who presents for evaluation of left cheek and facial swelling.    Brief Physical Exam: BP (!) 201/108 (BP Location: Right arm)   Pulse 114   Temp 99.9  F (37.7  C) (Oral)   Resp 23   Ht 1.892 m (6' 2.5\")   Wt 113.4 kg (250 lb)   SpO2 99%   BMI 31.67 kg/m    Physical Exam  Vitals and nursing note reviewed.   Constitutional:       Appearance: Normal appearance.   HENT:      Head: Normocephalic and atraumatic.      Right Ear: External ear normal.      Left Ear: External ear normal.      Nose: Nose normal. "   Eyes:      Extraocular Movements: Extraocular movements intact.      Conjunctiva/sclera: Conjunctivae normal.      Pupils: Pupils are equal, round, and reactive to light.   Neck:      Comments: Marked swelling of the left jaw, left cheek, and left side of the neck.  Hoarse voice, patient is spitting out his secretions.  Pulmonary:      Effort: Pulmonary effort is normal.   Musculoskeletal:         General: No swelling or deformity. Normal range of motion.   Neurological:      General: No focal deficit present.      Mental Status: He is alert and oriented to person, place, and time. Mental status is at baseline.   Psychiatric:         Mood and Affect: Mood normal.         Behavior: Behavior normal.         Thought Content: Thought content normal.     Labs and Imaging:  Results for orders placed or performed during the hospital encounter of 06/03/24   Soft tissue neck CT w contrast    Impression    IMPRESSION:   1.  Lucency surrounding the unerupted malpositioned left third mandibular molar contiguous with moderate soft tissue inflammatory changes involving the floor mouth as well as the submandibular, , and parapharyngeal spaces. Associated scattered   foci of gas and partial effacement of the oropharyngeal/hypopharyngeal airway. Findings are likely dental in etiology and compatible with gas-forming soft tissue infection, which can be seen with cellulitis, myonecrosis, and/or necrotizing fasciitis.   Correlate clinically to exclude Obed angina. Surgical evaluation is recommended.  2.  Mild retropharyngeal soft tissue edema and thickening without rim enhancement, most compatible with retropharyngeal cellulitis/effusion.   3.  No drainable neck abscess at this time.     These findings were discussed over the phone with Dr Cody Moy on 6/3/2024 at 1955 CST.   Result Value Ref Range    CRP Inflammation 285.00 (H) <5.00 mg/L   Basic metabolic panel   Result Value Ref Range    Sodium 135 135 - 145  mmol/L    Potassium 3.9 3.4 - 5.3 mmol/L    Chloride 99 98 - 107 mmol/L    Carbon Dioxide (CO2) 25 22 - 29 mmol/L    Anion Gap 11 7 - 15 mmol/L    Urea Nitrogen 12.9 8.0 - 23.0 mg/dL    Creatinine 1.29 (H) 0.67 - 1.17 mg/dL    GFR Estimate 63 >60 mL/min/1.73m2    Calcium 10.2 8.8 - 10.2 mg/dL    Glucose 122 (H) 70 - 99 mg/dL   CBC with platelets and differential   Result Value Ref Range    WBC Count 18.6 (H) 4.0 - 11.0 10e3/uL    RBC Count 4.48 4.40 - 5.90 10e6/uL    Hemoglobin 12.4 (L) 13.3 - 17.7 g/dL    Hematocrit 37.7 (L) 40.0 - 53.0 %    MCV 84 78 - 100 fL    MCH 27.7 26.5 - 33.0 pg    MCHC 32.9 31.5 - 36.5 g/dL    RDW 14.1 10.0 - 15.0 %    Platelet Count 271 150 - 450 10e3/uL    % Neutrophils 85 %    % Lymphocytes 5 %    % Monocytes 8 %    % Eosinophils 0 %    % Basophils 0 %    % Immature Granulocytes 2 %    NRBCs per 100 WBC 0 <1 /100    Absolute Neutrophils 15.8 (H) 1.6 - 8.3 10e3/uL    Absolute Lymphocytes 0.9 0.8 - 5.3 10e3/uL    Absolute Monocytes 1.5 (H) 0.0 - 1.3 10e3/uL    Absolute Eosinophils 0.0 0.0 - 0.7 10e3/uL    Absolute Basophils 0.0 0.0 - 0.2 10e3/uL    Absolute Immature Granulocytes 0.4 <=0.4 10e3/uL    Absolute NRBCs 0.0 10e3/uL   Lactic Acid Whole Blood with 1X Repeat in 2 HR when >2   Result Value Ref Range    Lactic Acid, Initial 1.3 0.7 - 2.0 mmol/L   Blood gas venous   Result Value Ref Range    pH Venous 7.44 (H) 7.32 - 7.43    pCO2 Venous 42 40 - 50 mm Hg    pO2 Venous 43 25 - 47 mm Hg    Bicarbonate Venous 28 21 - 28 mmol/L    Base Excess/Deficit Venous 3.9 (H) -3.0 - 3.0 mmol/L    FIO2 21     Oxyhemoglobin Venous 78 (H) 70 - 75 %    O2 Sat, Venous 79.3 (H) 70.0 - 75.0 %     Nicholas Espinoza M.D.  [facility] EMERGENCY ROOM  [facility] [facility] [id number] [phone]     Nicholas Espinoza MD  06/03/24 2947

## 2024-06-04 NOTE — PLAN OF CARE
ICU End of Shift Summary. See flowsheets for vital signs and detailed assessment. Handoff report given to oncoming RN.     Neuro: Sedated on propofol and fentanyl gtts. RASS -2, follows simple commands, moves all extremities, PERRL.   CV: SR, SBP goal maintained without intervention. Afebrile.  Resp: CMV settings 40%/16/450/5, LS clear, scant secretions from ETT, moderate secretions from oral cavity.  GI/: OG with TF started at 10 ml/hr and standard flushes. No BM this shift. Garg removed, due to void.  Skin: Left neck incision with penrose drain x 3, penrose drain x 1 in mouth.

## 2024-06-04 NOTE — PROGRESS NOTES
CLINICAL NUTRITION SERVICES - ASSESSMENT NOTE     Nutrition Prescription    RECOMMENDATIONS FOR MDs/PROVIDERS TO ORDER:  Continue RN-managed HIGH replacement protocol for K+, Mg++, Phos    Malnutrition Status:    Patient does not meet two of the established criteria necessary for diagnosing malnutrition    Recommendations already ordered by Registered Dietitian (RD):  EN support via OGT:  TwoCal HN @ 50 mL/hr (1200 mL/day) + 1 pkt ProSource TF Free** QID (4 pkts total) to provide 2760 kcals (29 kcal/kg/day), 161 g PRO (1.7 g/kg/day), 840 mL H2O, 263 g CHO and 6 g Fiber daily.   - Initiate @ 10 mL/hr and advance by 10 mL q8hr as tolerated to goal rate.   - Do not advance unless K+ >/= 3, Mg++ >/=1.5, and phos >/=1.9  - 30 mL q4hr fluid flushes for tube patency. Additional fluids and/or adjustments per MD.   - Multivitamin/mineral (15 mL/day via FT) to help ensure micronutrient needs being met with suspected hypermetabolic demands and potential interruptions to TF infusions.  - Gastric access: HOB >30 degrees.      **Pt family requested plant-based protein pkts due to pt following a plant-based diet. Confirmed with family that dairy-based protein in TF formula is ok as pt consumes dairy.    Future/Additional Recommendations:  -Monitor tolerance and lytes with advancement to goal TF rate via OGT.  -Monitor diet advancement post-extubation. Would likely benefit from ONS (no Gelatein, Magic Cup, or Expedite d/t animal tissue-derived ingredients).      REASON FOR ASSESSMENT  Giovanny ROMA Forbes Jr. is a/an 58 year old male assessed by the dietitian for Provider Order - Registered Dietitian to Assess and Order TF per Medical Nutrition Therapy Protocol    NUTRITION HISTORY  Not previously known to Clinical Nutrition.     No known allergies.    Met with pt at bedside. Pt is intubated, sedated but multiple family members at bedside. They share he was healthy prior to this hospitalization. Pt follows a plant-based, pescitarian  "diet, eating fish, eggs, and dairy, but avoids poultry and meat (beef, pork). No food allergies. No nutrition concerns.    CURRENT NUTRITION ORDERS  Diet: NPO - ok for soft diet per OMFS once extubated    LABS  Labs reviewed  Electrolytes  Potassium (mmol/L)   Date Value   06/04/2024 4.0   06/03/2024 3.9   04/30/2024 4.3   01/20/2020 4.0    Blood Glucose  Glucose (mg/dL)   Date Value   06/04/2024 133 (H)   06/03/2024 122 (H)   04/30/2024 85   01/20/2020 138 (H)     GLUCOSE BY METER POCT (mg/dL)   Date Value   06/04/2024 116 (H)   06/04/2024 130 (H)   06/04/2024 129 (H)    Inflammatory Markers  WBC (10e9/L)   Date Value   01/20/2020 8.0     WBC Count (10e3/uL)   Date Value   06/04/2024 13.9 (H)   06/03/2024 18.6 (H)   04/30/2024 5.3     Albumin (g/dL)   Date Value   04/30/2024 4.3      Sodium (mmol/L)   Date Value   06/04/2024 137   06/03/2024 135   04/30/2024 134 (L)   01/20/2020 138    Renal  Urea Nitrogen (mg/dL)   Date Value   06/04/2024 14.0   06/03/2024 12.9   04/30/2024 13.1   01/20/2020 28     Creatinine (mg/dL)   Date Value   06/04/2024 1.18 (H)   06/03/2024 1.29 (H)   04/30/2024 1.14   01/20/2020 1.37 (H)     Additional  No results found for: \"TRIG\", \"URINEKETONE\"     MEDICATIONS  Medications reviewed  -Medium dose sliding scale insulin  -Miralax  -Senna-docusate  -LR @ 100 mL/hr  -Propofol gtt    ANTHROPOMETRICS  Height:   Ht Readings from Last 1 Encounters:   06/03/24 1.892 m (6' 2.5\")   Most Recent Weight: 118.6 kg (261 lb 7.5 oz)    IBW: 89.1 kg   BMI: 33.12 kg/m2; Obesity Grade I BMI 30-34.9  Weight History: Limited recent wt hx in Care Everywhere.   Wt Readings from Last 20 Encounters:   06/04/24 118.6 kg (261 lb 7.5 oz)   06/03/24 113.4 kg (250 lb)   04/30/24 121.5 kg (267 lb 12.8 oz)   06/07/23 114.8 kg (253 lb 1.6 oz)   01/20/20 117.9 kg (260 lb)     Dosing Weight: 96 kg (adjusted, based on admit wt of 118.6 kg on 6/4 and IBW of 89.1 kg)    ASSESSED NUTRITION NEEDS  Estimated Energy Needs: " 6538-7043 kcals/day (25 - 30 kcals/kg)  Justification: Maintenance  Estimated Protein Needs: 144-192 grams protein/day (1.5 - 2 grams of pro/kg)  Justification: Increased needs post-op, critical illness  Estimated Fluid Needs: 1 mL/kcal   Justification: Maintenance or Per provider pending fluid status    PHYSICAL FINDINGS  See malnutrition section below.  -Skin, hair WNL  -Bilateral hands in mitts so UTV nails    MALNUTRITION  % Intake: Decreased intake does not meet criteria  % Weight Loss: None noted  Subcutaneous Fat Loss: None observed  Muscle Loss: None observed - appears to have good muscle tone, pt is healthy at baseline per family members at bedside  Fluid Accumulation/Edema: None noted  Malnutrition Diagnosis: Patient does not meet two of the established criteria necessary for diagnosing malnutrition    NUTRITION DIAGNOSIS  Altered GI function related to s/p I&D of L submandibular, sublingual, and lateral pharyngeal abscesses and intubated as evidenced by NPO status, OGT and consult to start EN support to meet nutrition needs at this time.      INTERVENTIONS  Implementation  -Nutrition Education (pt family): Provided education on RD role, role of NFPE. Discussed EN support regimen and answered questions.    -Enteral Nutrition - Initiate  -Feeding tube flush  -Multivitamin/mineral supplement therapy     Goals  Once TF at goal rate, total avg nutritional intake to meet a minimum of 25 kcal/kg and 1.5 g PRO/kg daily (per dosing wt 96 kg).     Monitoring/Evaluation  Progress toward goals will be monitored and evaluated per protocol.  Deanna Becerra RD, LD  Available on [email or web address] - can search by name or unit Dietitian  **Clinical Nutrition is no longer available via pager

## 2024-06-04 NOTE — PLAN OF CARE
Admitted/transferred from: PACU  Reason for admission/transfer: S/P I&D L neck  2 RN skin assessment: completed by Sonia CARDONA and Yesenia BRYSON RN  Result of skin assessment and interventions/actions: Scabbing bilat shins. L neck incision w/penrose drain. Penrose drain in mouth w/bloody secretions. Sacral mepilex applied  Height, weight, drug calc weight: Done  Patient belongings (see Flowsheet)  MDRO education added to care planN/A  ?

## 2024-06-04 NOTE — ED PROVIDER NOTES
[place] EMERGENCY DEPARTMENT ([facility])    6/03/24       ED PROVIDER NOTE      History   No chief complaint on file.    HPI  Giovanny Forbes Jr. is a 58 year old male with a past medical history significant for ongoing necrotizing soft tissue infection, HTN, and HLD who presents to the ED from [facility] for further management of the necrotizing soft tissue infection to the left cheek and the left side of the neck.     Patient had initially presented to his dentist for evaluation of left-sided facial swelling earlier today, who then referred him to [facility] ED.  While at [facility], he underwent a CT notable for a necrotizing soft tissue infection to the left side of his cheek and neck.  He was then noted to be experiencing worsening swelling and difficulty handling secretions. Due to concern for the airway, they had elected to intubate.  The patient was then started on broad-spectrum antibiotics (clindamycin, vancomycin, and Zosyn) and transferred to this facility for surgical debridement and possible need for hyperbaric therapy.    Patient presents to this ED with intubation in place.  Per EMS, the patient had been febrile with a low-grade fever of 99  F upon their arrival.  Patient is now afebrile and under sedation.    Past Medical History  Past Medical History:   Diagnosis Date    HTN (hypertension)      Past Surgical History:   Procedure Laterality Date    HERNIA REPAIR, INGUINAL RT/LT Bilateral     age 11     hydrochlorothiazide (HYDRODIURIL) 25 MG tablet  study - aspirin, IDS# [id number], 81 mg tablet      No Known Allergies  Family History  Family History   Problem Relation Age of Onset    Hypertension Mother     Hypertension Father     Myocardial Infarction Father 42    Asthma Maternal Grandmother     Diabetes Type 2  Maternal Grandmother      Social History   Social History     Tobacco Use    Smoking status: Never     Passive exposure: Never    Smokeless tobacco: Never   Vaping  Use    Vaping status: Never Used      Past medical history, past surgical history, medications, allergies, family history, and social history were reviewed with the patient. No additional pertinent items.        Physical Exam      Physical Exam  Vitals and nursing note reviewed.   Constitutional:       Appearance: He is well-developed. He is diaphoretic.      Comments: Intubated, sedated.   HENT:      Head: Normocephalic and atraumatic.   Neck:     Cardiovascular:      Rate and Rhythm: Normal rate and regular rhythm.      Heart sounds: Normal heart sounds. No murmur heard.     No friction rub. No gallop.   Pulmonary:      Effort: Pulmonary effort is normal. No respiratory distress.      Breath sounds: Normal breath sounds. No wheezing.   Abdominal:      General: Bowel sounds are normal. There is no distension.      Palpations: Abdomen is soft.      Tenderness: There is no abdominal tenderness.   Musculoskeletal:         General: No tenderness or deformity. Normal range of motion.      Cervical back: Normal range of motion and neck supple.   Skin:     General: Skin is warm.      Coloration: Skin is not pale.      Findings: No erythema or rash.   Neurological:      Mental Status: He is alert.           ED Course, Procedures, & Data    10:13 PM  The patient was seen and examined by Shamir Light   in Room ED02.     Procedures                Results for orders placed or performed during the hospital encounter of 06/03/24   Soft tissue neck CT w contrast     Status: None    Narrative    EXAM: CT SOFT TISSUE NECK W CONTRAST  LOCATION: [facility]  DATE: 6/3/2024    INDICATION: Large left sided facial swelling, likely dental abscess of left wisdom tooth.  COMPARISON: None.  CONTRAST: ISOVUE 370 90 mL  TECHNIQUE: Routine CT Soft Tissue Neck with IV contrast. Multiplanar reformats. Dose reduction techniques were used.    FINDINGS:  MUCOSAL SPACES/SOFT TISSUES: Limited assessment of the floor of  mouth and dentition due to streak artifact from dental restorations. Unerupted bilateral third maxillary and left mandibular molars, the latter malpositioned with surrounding osseous lucency   involving the inferior alveolar ridge. This is contiguous with moderate soft tissue induration involving the floor of mouth as well as the submandibular, parapharyngeal, and  spaces on the left where there is moderate-volume soft tissue air as   well as several small locules of fluid, the latter favored to represent phlegmonous change. Mild retropharyngeal edema and thickening. Moderate thickening of the platysma muscle on the left with overlying subcutaneous fat stranding extending along the   left anterolateral neck. No discrete drainable rim-enhancing fluid collection within the neck. Mild thickening of the left aryepiglottic fold, likely reactive. Unremarkable epiglottis and infraglottic larynx. Partial effacement of the oropharyngeal and   hypopharyngeal airway due to above-described soft tissue swelling with small-volume secretions, most notably in the postcricoid hypopharynx.     LYMPH NODES: No pathologic lymph nodes by size or morphology criteria.     SALIVARY GLANDS: Heterogeneous contrast enhancement involving the left submandibular and, to a lesser extent, parotid glands, the former with mild diffuse enlargement. Findings are presumably reactive in nature. No sialolith identified. Unremarkable   right parotid and submandibular glands.    THYROID: No discrete nodule or enlargement.     VESSELS: Not well assessed due to technique.    VISUALIZED INTRACRANIAL/ORBITS/SINUSES: Within technique limitations, no acute abnormality of the visualized intracranial compartment or orbits. Mild polypoid left maxillary sinus mucosal thickening with multiple with multiple mucus retention cysts   without an air-fluid level. No mastoid effusion.    OTHER: No destructive osseous lesion. Mild to moderate multilevel cervical  spondylosis. Clear visualized lungs.      Impression    IMPRESSION:   1.  Lucency surrounding the unerupted malpositioned left third mandibular molar contiguous with moderate soft tissue inflammatory changes involving the floor mouth as well as the submandibular, , and parapharyngeal spaces. Associated scattered   foci of gas and partial effacement of the oropharyngeal/hypopharyngeal airway. Findings are likely dental in etiology and compatible with gas-forming soft tissue infection, which can be seen with cellulitis, myonecrosis, and/or necrotizing fasciitis.   Correlate clinically to exclude Obed angina. Surgical evaluation is recommended.  2.  Mild retropharyngeal soft tissue edema and thickening without rim enhancement, most compatible with retropharyngeal cellulitis/effusion.   3.  No drainable neck abscess at this time.     These findings were discussed over the phone with Dr Cody Moy on 6/3/2024 at 1955 CST.   XR Chest Port 1 View     Status: Abnormal   Result Value Ref Range    Radiologist flags Right mainstem bronchial intubation (AA)     Narrative    EXAM: XR CHEST PORT 1 VIEW  LOCATION: [facility]  DATE: 6/3/2024    INDICATION: post intubation  COMPARISON: None.      Impression    IMPRESSION: Endotracheal tube terminates one CM below the claudia in the right mainstem bronchus. Nasogastric tube in the stomach. No evidence for CHF or pneumonia. No pleural effusion or pneumothorax. Normal heart size.        [Critical Result: Right mainstem bronchial intubation]    Finding was identified on 6/3/2024 9:42 PM CDT.     Brenda Yang R.N. was contacted by me on 6/3/2024 9:52 PM CDT and verbalized understanding of the critical result. She reports the endotracheal tube has been retracted one CM. Recommended 2-3 CM further retraction for mid tracheal positioning. The   critical result will be communicated to the receiving care team at destination facility.     CRP  inflammation     Status: Abnormal   Result Value Ref Range    CRP Inflammation 285.00 (H) <5.00 mg/L   Basic metabolic panel     Status: Abnormal   Result Value Ref Range    Sodium 135 135 - 145 mmol/L    Potassium 3.9 3.4 - 5.3 mmol/L    Chloride 99 98 - 107 mmol/L    Carbon Dioxide (CO2) 25 22 - 29 mmol/L    Anion Gap 11 7 - 15 mmol/L    Urea Nitrogen 12.9 8.0 - 23.0 mg/dL    Creatinine 1.29 (H) 0.67 - 1.17 mg/dL    GFR Estimate 63 >60 mL/min/1.73m2    Calcium 10.2 8.8 - 10.2 mg/dL    Glucose 122 (H) 70 - 99 mg/dL   CBC with platelets and differential     Status: Abnormal   Result Value Ref Range    WBC Count 18.6 (H) 4.0 - 11.0 10e3/uL    RBC Count 4.48 4.40 - 5.90 10e6/uL    Hemoglobin 12.4 (L) 13.3 - 17.7 g/dL    Hematocrit 37.7 (L) 40.0 - 53.0 %    MCV 84 78 - 100 fL    MCH 27.7 26.5 - 33.0 pg    MCHC 32.9 31.5 - 36.5 g/dL    RDW 14.1 10.0 - 15.0 %    Platelet Count 271 150 - 450 10e3/uL    % Neutrophils 85 %    % Lymphocytes 5 %    % Monocytes 8 %    % Eosinophils 0 %    % Basophils 0 %    % Immature Granulocytes 2 %    NRBCs per 100 WBC 0 <1 /100    Absolute Neutrophils 15.8 (H) 1.6 - 8.3 10e3/uL    Absolute Lymphocytes 0.9 0.8 - 5.3 10e3/uL    Absolute Monocytes 1.5 (H) 0.0 - 1.3 10e3/uL    Absolute Eosinophils 0.0 0.0 - 0.7 10e3/uL    Absolute Basophils 0.0 0.0 - 0.2 10e3/uL    Absolute Immature Granulocytes 0.4 <=0.4 10e3/uL    Absolute NRBCs 0.0 10e3/uL   Lactic Acid Whole Blood with 1X Repeat in 2 HR when >2     Status: Normal   Result Value Ref Range    Lactic Acid, Initial 1.3 0.7 - 2.0 mmol/L   Blood gas venous     Status: Abnormal   Result Value Ref Range    pH Venous 7.44 (H) 7.32 - 7.43    pCO2 Venous 42 40 - 50 mm Hg    pO2 Venous 43 25 - 47 mm Hg    Bicarbonate Venous 28 21 - 28 mmol/L    Base Excess/Deficit Venous 3.9 (H) -3.0 - 3.0 mmol/L    FIO2 21     Oxyhemoglobin Venous 78 (H) 70 - 75 %    O2 Sat, Venous 79.3 (H) 70.0 - 75.0 %    Narrative    In healthy individuals, oxyhemoglobin (O2Hb)  and oxygen saturation (SO2) are approximately equal. In the presence of dyshemoglobins, oxyhemoglobin can be considerably lower than oxygen saturation.   CBC with platelets + differential     Status: Abnormal    Narrative    The following orders were created for panel order CBC with platelets + differential.  Procedure                               Abnormality         Status                     ---------                               -----------         ------                     CBC with platelets and d...[[id number]]  Abnormal            Final result                 Please view results for these tests on the individual orders.     Medications - No data to display  Labs Ordered and Resulted from Time of ED Arrival to Time of ED Departure - No data to display  No orders to display          Critical Care time was 30 minutes for this patient excluding procedures.     Assessment & Plan    Is a 58-year-old male who was transferred from [facility] with concern for necrotizing soft tissue infection of the jaw and neck.  This was found at outside hospital, symptoms began several days ago.  Patient was found to have soft tissue infection with gas and airway compromise.  Patient was intubated prior to transfer.  He was given vancomycin and piperacillin/tazobactam.  Patient arrives intubated and sedated.  He did have a decrease in systolic blood pressure in route and was started on norepinephrine.  Blood pressure improved upon arrival.  I discussed case with family.  Patient was seen by OMFS who plans to take patient to the OR.  They plan to continue to keep patient intubated after surgery so patient will need ICU bed.  Patient was sent to OR with OMFS.    I have reviewed the nursing notes. I have reviewed the findings, diagnosis, plan and need for follow up with the patient.    New Prescriptions    No medications on file       Final diagnoses:   None   I, Zaira Sanders, am serving as a trained medical scribe to document services  personally performed by Shamir Light DO, based on the provider's statements to me.     I, Shamir Light DO, was physically present and have reviewed and verified the accuracy of this note documented by Zaira Sanders.      Shamir Light DO  [facility] EMERGENCY DEPARTMENT  6/3/2024     Shamir Light DO  06/04/24 0210

## 2024-06-05 ENCOUNTER — APPOINTMENT (OUTPATIENT)
Dept: ULTRASOUND IMAGING | Facility: CLINIC | Age: 59
DRG: 143 | End: 2024-06-05
Payer: COMMERCIAL

## 2024-06-05 ENCOUNTER — APPOINTMENT (OUTPATIENT)
Dept: OCCUPATIONAL THERAPY | Facility: CLINIC | Age: 59
DRG: 143 | End: 2024-06-05
Payer: COMMERCIAL

## 2024-06-05 LAB
ANION GAP SERPL CALCULATED.3IONS-SCNC: 8 MMOL/L (ref 7–15)
BUN SERPL-MCNC: 31 MG/DL (ref 8–23)
CALCIUM SERPL-MCNC: 8.7 MG/DL (ref 8.8–10.2)
CHLORIDE SERPL-SCNC: 107 MMOL/L (ref 98–107)
CREAT SERPL-MCNC: 1.22 MG/DL (ref 0.67–1.17)
CRP SERPL-MCNC: 261 MG/L
DEPRECATED HCO3 PLAS-SCNC: 24 MMOL/L (ref 22–29)
EGFRCR SERPLBLD CKD-EPI 2021: 68 ML/MIN/1.73M2
ERYTHROCYTE [DISTWIDTH] IN BLOOD BY AUTOMATED COUNT: 15 % (ref 10–15)
GLUCOSE BLDC GLUCOMTR-MCNC: 110 MG/DL (ref 70–99)
GLUCOSE BLDC GLUCOMTR-MCNC: 118 MG/DL (ref 70–99)
GLUCOSE BLDC GLUCOMTR-MCNC: 137 MG/DL (ref 70–99)
GLUCOSE BLDC GLUCOMTR-MCNC: 141 MG/DL (ref 70–99)
GLUCOSE BLDC GLUCOMTR-MCNC: 154 MG/DL (ref 70–99)
GLUCOSE SERPL-MCNC: 151 MG/DL (ref 70–99)
HCT VFR BLD AUTO: 30.1 % (ref 40–53)
HGB BLD-MCNC: 9.7 G/DL (ref 13.3–17.7)
MAGNESIUM SERPL-MCNC: 2.6 MG/DL (ref 1.7–2.3)
MCH RBC QN AUTO: 28.2 PG (ref 26.5–33)
MCHC RBC AUTO-ENTMCNC: 32.2 G/DL (ref 31.5–36.5)
MCV RBC AUTO: 88 FL (ref 78–100)
PHOSPHATE SERPL-MCNC: 3.1 MG/DL (ref 2.5–4.5)
PLATELET # BLD AUTO: 286 10E3/UL (ref 150–450)
POTASSIUM SERPL-SCNC: 4.3 MMOL/L (ref 3.4–5.3)
RBC # BLD AUTO: 3.44 10E6/UL (ref 4.4–5.9)
SODIUM SERPL-SCNC: 139 MMOL/L (ref 135–145)
VANCOMYCIN SERPL-MCNC: 11.1 UG/ML
WBC # BLD AUTO: 14.1 10E3/UL (ref 4–11)

## 2024-06-05 PROCEDURE — 84100 ASSAY OF PHOSPHORUS: CPT

## 2024-06-05 PROCEDURE — 250N000011 HC RX IP 250 OP 636

## 2024-06-05 PROCEDURE — 250N000011 HC RX IP 250 OP 636: Performed by: DENTIST

## 2024-06-05 PROCEDURE — 250N000013 HC RX MED GY IP 250 OP 250 PS 637

## 2024-06-05 PROCEDURE — 83735 ASSAY OF MAGNESIUM: CPT

## 2024-06-05 PROCEDURE — 93971 EXTREMITY STUDY: CPT | Mod: LT

## 2024-06-05 PROCEDURE — 999N000157 HC STATISTIC RCP TIME EA 10 MIN

## 2024-06-05 PROCEDURE — 36415 COLL VENOUS BLD VENIPUNCTURE: CPT

## 2024-06-05 PROCEDURE — 97165 OT EVAL LOW COMPLEX 30 MIN: CPT | Mod: GO | Performed by: OCCUPATIONAL THERAPIST

## 2024-06-05 PROCEDURE — 99233 SBSQ HOSP IP/OBS HIGH 50: CPT

## 2024-06-05 PROCEDURE — 97535 SELF CARE MNGMENT TRAINING: CPT | Mod: GO | Performed by: OCCUPATIONAL THERAPIST

## 2024-06-05 PROCEDURE — 80202 ASSAY OF VANCOMYCIN: CPT | Performed by: DENTIST

## 2024-06-05 PROCEDURE — 85027 COMPLETE CBC AUTOMATED: CPT

## 2024-06-05 PROCEDURE — 94003 VENT MGMT INPAT SUBQ DAY: CPT

## 2024-06-05 PROCEDURE — 80048 BASIC METABOLIC PNL TOTAL CA: CPT

## 2024-06-05 PROCEDURE — 120N000005 HC R&B MS OVERFLOW UMMC

## 2024-06-05 PROCEDURE — 999N000127 HC STATISTIC PERIPHERAL IV START W US GUIDANCE

## 2024-06-05 PROCEDURE — 36415 COLL VENOUS BLD VENIPUNCTURE: CPT | Performed by: DENTIST

## 2024-06-05 PROCEDURE — 258N000003 HC RX IP 258 OP 636

## 2024-06-05 PROCEDURE — 250N000013 HC RX MED GY IP 250 OP 250 PS 637: Performed by: STUDENT IN AN ORGANIZED HEALTH CARE EDUCATION/TRAINING PROGRAM

## 2024-06-05 PROCEDURE — 250N000011 HC RX IP 250 OP 636: Performed by: EMERGENCY MEDICINE

## 2024-06-05 PROCEDURE — 86140 C-REACTIVE PROTEIN: CPT

## 2024-06-05 PROCEDURE — 93971 EXTREMITY STUDY: CPT | Mod: 26 | Performed by: RADIOLOGY

## 2024-06-05 RX ORDER — ACETAMINOPHEN 325 MG/10.15ML
650 LIQUID ORAL EVERY 4 HOURS PRN
Status: DISCONTINUED | OUTPATIENT
Start: 2024-06-05 | End: 2024-06-05

## 2024-06-05 RX ORDER — HYDROCHLOROTHIAZIDE 25 MG/1
25 TABLET ORAL DAILY
Status: DISCONTINUED | OUTPATIENT
Start: 2024-06-05 | End: 2024-06-06 | Stop reason: HOSPADM

## 2024-06-05 RX ORDER — ASPIRIN 81 MG/1
81 TABLET, CHEWABLE ORAL DAILY
Status: DISCONTINUED | OUTPATIENT
Start: 2024-06-05 | End: 2024-06-06 | Stop reason: HOSPADM

## 2024-06-05 RX ORDER — IBUPROFEN 200 MG
600 TABLET ORAL EVERY 6 HOURS PRN
Status: DISCONTINUED | OUTPATIENT
Start: 2024-06-05 | End: 2024-06-06 | Stop reason: HOSPADM

## 2024-06-05 RX ORDER — ACETAMINOPHEN 325 MG/1
650 TABLET ORAL EVERY 4 HOURS PRN
Status: DISCONTINUED | OUTPATIENT
Start: 2024-06-05 | End: 2024-06-06 | Stop reason: HOSPADM

## 2024-06-05 RX ORDER — ACETAMINOPHEN 650 MG/1
650 SUPPOSITORY RECTAL EVERY 4 HOURS PRN
Status: DISCONTINUED | OUTPATIENT
Start: 2024-06-05 | End: 2024-06-05

## 2024-06-05 RX ADMIN — INSULIN ASPART 1 UNITS: 100 INJECTION, SOLUTION INTRAVENOUS; SUBCUTANEOUS at 19:50

## 2024-06-05 RX ADMIN — CLINDAMYCIN IN 5 PERCENT DEXTROSE 900 MG: 18 INJECTION, SOLUTION INTRAVENOUS at 23:10

## 2024-06-05 RX ADMIN — ENOXAPARIN SODIUM 40 MG: 40 INJECTION SUBCUTANEOUS at 14:48

## 2024-06-05 RX ADMIN — PIPERACILLIN AND TAZOBACTAM 4.5 G: 4; .5 INJECTION, POWDER, LYOPHILIZED, FOR SOLUTION INTRAVENOUS at 18:45

## 2024-06-05 RX ADMIN — INSULIN ASPART 1 UNITS: 100 INJECTION, SOLUTION INTRAVENOUS; SUBCUTANEOUS at 09:02

## 2024-06-05 RX ADMIN — PIPERACILLIN AND TAZOBACTAM 4.5 G: 4; .5 INJECTION, POWDER, LYOPHILIZED, FOR SOLUTION INTRAVENOUS at 13:21

## 2024-06-05 RX ADMIN — IBUPROFEN 600 MG: 200 TABLET, FILM COATED ORAL at 11:23

## 2024-06-05 RX ADMIN — CLINDAMYCIN IN 5 PERCENT DEXTROSE 900 MG: 18 INJECTION, SOLUTION INTRAVENOUS at 16:21

## 2024-06-05 RX ADMIN — PROPOFOL 40 MCG/KG/MIN: 10 INJECTION, EMULSION INTRAVENOUS at 02:42

## 2024-06-05 RX ADMIN — PIPERACILLIN AND TAZOBACTAM 4.5 G: 4; .5 INJECTION, POWDER, LYOPHILIZED, FOR SOLUTION INTRAVENOUS at 07:53

## 2024-06-05 RX ADMIN — DOCUSATE SODIUM 50 MG AND SENNOSIDES 8.6 MG 2 TABLET: 8.6; 5 TABLET, FILM COATED ORAL at 08:48

## 2024-06-05 RX ADMIN — SODIUM CHLORIDE, POTASSIUM CHLORIDE, SODIUM LACTATE AND CALCIUM CHLORIDE: 600; 310; 30; 20 INJECTION, SOLUTION INTRAVENOUS at 05:50

## 2024-06-05 RX ADMIN — Medication 15 ML: at 08:48

## 2024-06-05 RX ADMIN — ASPIRIN 81 MG CHEWABLE TABLET 81 MG: 81 TABLET CHEWABLE at 10:20

## 2024-06-05 RX ADMIN — PROPOFOL 35 MCG/KG/MIN: 10 INJECTION, EMULSION INTRAVENOUS at 05:46

## 2024-06-05 RX ADMIN — Medication 40 MG: at 09:03

## 2024-06-05 RX ADMIN — DEXAMETHASONE SODIUM PHOSPHATE 10 MG: 10 INJECTION, SOLUTION INTRAMUSCULAR; INTRAVENOUS at 01:26

## 2024-06-05 RX ADMIN — CLINDAMYCIN IN 5 PERCENT DEXTROSE 900 MG: 18 INJECTION, SOLUTION INTRAVENOUS at 08:48

## 2024-06-05 RX ADMIN — Medication 1 PACKET: at 09:03

## 2024-06-05 RX ADMIN — HYDROCHLOROTHIAZIDE 25 MG: 25 TABLET ORAL at 10:20

## 2024-06-05 RX ADMIN — VANCOMYCIN HYDROCHLORIDE 1250 MG: 10 INJECTION, POWDER, LYOPHILIZED, FOR SOLUTION INTRAVENOUS at 19:43

## 2024-06-05 RX ADMIN — ACETAMINOPHEN 650 MG: 325 TABLET, FILM COATED ORAL at 15:01

## 2024-06-05 RX ADMIN — PIPERACILLIN AND TAZOBACTAM 4.5 G: 4; .5 INJECTION, POWDER, LYOPHILIZED, FOR SOLUTION INTRAVENOUS at 01:26

## 2024-06-05 RX ADMIN — POLYETHYLENE GLYCOL 3350 17 G: 17 POWDER, FOR SOLUTION ORAL at 08:48

## 2024-06-05 RX ADMIN — CHLORHEXIDINE GLUCONATE 0.12% ORAL RINSE 15 ML: 1.2 LIQUID ORAL at 09:02

## 2024-06-05 ASSESSMENT — ACTIVITIES OF DAILY LIVING (ADL)
ADLS_ACUITY_SCORE: 45
ADLS_ACUITY_SCORE: 41
ADLS_ACUITY_SCORE: 30
ADLS_ACUITY_SCORE: 30
ADLS_ACUITY_SCORE: 47
ADLS_ACUITY_SCORE: 47
ADLS_ACUITY_SCORE: 45
ADLS_ACUITY_SCORE: 28
ADLS_ACUITY_SCORE: 47
ADLS_ACUITY_SCORE: 45
ADLS_ACUITY_SCORE: 28
ADLS_ACUITY_SCORE: 45
ADLS_ACUITY_SCORE: 41

## 2024-06-05 NOTE — PROGRESS NOTES
Alert and oriented x 4. Pain control with tylenol/ibuprofen PRN. Patient extubated this AM without issue. Lungs clear on room air. Pulmonary hygiene encouraged. Normal sinus rhythm. VSS. Afebrile. Tolerating regular mechanical soft diet. Good PO intake. Loose BM x 1. Voiding appropriately via urinal. Penrose drain x 3 to right neck, small serosanguinous drainage output. Left arm swelling at old IV site, duplex negative. PIV x 2. Ambulating in hallway with PT. Standby assist. Out of bed to chair x 3 hours today. Plan to transfer to floor today.

## 2024-06-05 NOTE — DISCHARGE SUMMARY
[facility]    Oral & Maxillofacial Surgery - Discharge Summary    Date of Admission:  6/3/2024  Date of Discharge:  6/6/24  Discharging Attending Provider: Radha Townsend DDS  Discharge Service: Oral & Maxillofacial Surgery    Discharge Diagnoses   (M79.89) Necrotizing soft tissue infection    Follow-ups Needed After Discharge   In the OMFS clinic Monday 6/10/24, Time TBD. Our clinic staff will call to schedule time     Oral and Maxillofacial Surgery (OMFS) Clinic  [facility]  [place] - 7th floor  [address] [place] [id number]  Clinic phone number: [phone]  Clinic fax number: [phone]     Hospital Course   Giovanny Forbes Jr. is a 58 year old male with a history of HTN and HLD who presented to OSH ED with left sided facial swelling and fevers and imaging concerning for NSTI. He was intubated at OSH for airway protection and transferred to [facility] for further management. He is now s/p incision and drainage of left sided submandibular, sublingual and lateral pharyngeal space abscess, extraction of #17 with placement of 4 penrose drains (3 at left neck, 1 in mouth) on 6/4/202  Remained intubated postoperatively and admitted to SICU for 2 days. He received appropriate antimicrobial coverage, enteral medications and diet. He was extubated safely on 6/5/24 and tolerated RA. He didn't require additional respiratory support, and was transferred to the floor in the morning of 6/6/24 and was noted to have significant improvement in swelling and drainage from drains.  The patient was found in bed very comfortable, alert and oriented, pleasant on 6/6/24.  The patient's pain was well controlled overnight.   The patient was able to take adequate liquid diet, able to void normally without complications.  Denied nausea and vomiting.  The patient could ambulate without difficulty.   All vital signs were within the normal limits.   Therefore, on postop day #3 as the patient was recovering well from surgery, pain and swelling were under control, it was decided that the patient be discharged home to  continue with recovery under the care of wife.       Consultations This Hospital Stay   NURSING TO CONSULT FOR VASCULAR ACCESS CARE IP CONSULT  PHARMACY TO DOSE VANCO  PHYSICAL THERAPY ADULT IP CONSULT  OCCUPATIONAL THERAPY ADULT IP CONSULT  NUTRITION SERVICES ADULT IP CONSULT  PHARMACY IP CONSULT  NURSING TO CONSULT FOR VASCULAR ACCESS CARE IP CONSULT    Code Status   Full Code     The patient was discussed with Dr. Sherita Knowles and Dr. Radha Sultana DDS  OMFS intern     Please page on call OMFS resident with questions.  ______________________________________________________________________    Physical Exam   Vital Signs: Temp: 98.3  F (36.8  C) Temp src: Oral BP: (!) 153/84 Pulse: 75   Resp: 18 SpO2: 99 % O2 Device: None (Room air) Oxygen Delivery: 2 LPM  Weight: 259 lbs 4.18 oz    Exam:  Neuro: NAD, alert and oriented, comfortable in bed   HEENT: Resolving left submandibular and retromandibular swelling soft and non fluctuant, Able to palpate the inferior border of mandible throughout. 3 penroses drains with minimal serosanguinous drainage, on palpation we can express a little bit more. Dressing slightly saturated with serosanguinous drainage and neuro netting present   Oral: Left posterior mandible with 1 penrose drain and minimal output. Secured with silk suture. Extraction socket hemostatic, left lateral pharyngeal and retromolar soft and non-distended.   Pulm/Resp: Clear breath sounds bilaterally without rhonchi, crackles or wheeze, breathing non-labored  CV: RRR  Abdomen: Soft, non-distended, non-tender, no rebound tenderness or guarding, no masses  Incisions/Skin: Small amount serosanguinous drainage to left neck dressing  MSK/Extremities: +1 peripheral edema, moving all extremities, peripheral pulses intact,  calves compressible, extremities well perfused. RUE appears swollen, taught ( possible r/t IVs)  Skin: Left neck incision with penrose drain x 3, penrose drain x 1 in mouth.     Significant Results and Procedures   Most Recent 3 CBC's:  Recent Labs   Lab Test 06/06/24  0524 06/05/24  0644 06/04/24  0629   WBC 14.9* 14.1* 13.9*   HGB 9.9* 9.7* 10.2*   MCV 87 88 86    286 237     Most Recent 3 BMP's:  Recent Labs   Lab Test 06/06/24  0843 06/06/24  0524 06/06/24  0346 06/05/24  0902 06/05/24  0644 06/04/24  0744 06/04/24  0629   NA  --  137  --   --  139  --  137   POTASSIUM  --  3.9  --   --  4.3  --  4.0   CHLORIDE  --  104  --   --  107  --  104   CO2  --  25  --   --  24  --  22   BUN  --  30.6*  --   --  31.0*  --  14.0   CR  --  1.29*  --   --  1.22*  --  1.18*   ANIONGAP  --  8  --   --  8  --  11   JAYLEN  --  8.8  --   --  8.7*  --  8.6*   GLC 93 100* 106*   < > 151*   < > 133*    < > = values in this interval not displayed.       Pending Results   These results will be followed up on Monday 6/10/2024 at the outpatient appointment   Unresulted Labs Ordered in the Past 30 Days of this Admission       Date and Time Order Name Status Description    6/4/2024  2:25 AM Abscess Aerobic Bacterial Culture Routine Preliminary     6/4/2024  2:25 AM Fungal or Yeast Culture Routine Preliminary     6/4/2024  2:25 AM Anaerobic Bacterial Culture Routine Preliminary     6/3/2024  8:03 PM Blood Culture Line, venous Preliminary     6/3/2024  8:03 PM Blood Culture Line, venous Preliminary                Primary Care Physician   Lorne Ferrer    Discharge Disposition   Discharged to home  Condition at discharge: Stable    Discharge Orders   No discharge procedures on file.  Discharge Medications   Current Discharge Medication List        CONTINUE these medications which have NOT CHANGED    Details   aspirin 81 MG EC tablet Take 81 mg by mouth daily      hydrochlorothiazide (HYDRODIURIL) 25 MG tablet Take 1 tablet (25 mg)  by mouth daily  Qty: 90 tablet, Refills: 3    Associated Diagnoses: Primary hypertension           Allergies   No Known Allergies

## 2024-06-05 NOTE — PROGRESS NOTES
SURGICAL ICU PROGRESS NOTE  06/05/2024        Date of Service (when I saw the patient): 06/05/2024    ASSESSMENT: Giovanny Forbes Jr. is a 58 year old male with a history of HTN and HLD who presented to OSH ED with left sided facial swelling and fevers and imaging concerning for NSTI. He was intubated at OSH for airway protection and transferred to [facility] for further management. He is now s/p incision and drainage of left sided submandibular, sublingual and lateral pharyngeal space abscess, extraction of #17 with placement of 4 penrose drains (3 at left neck, 1 in mouth) on 6/4/202  Remained intubated postoperatively and admitted to SICU.     CHANGES and MAJOR THINGS TODAY:   - PST, with plans to extubate  - Bedside swallow if able to extubate   - Advance diet per OMFS  - LUE ultrasound  - Restart PTA meds  - PRN pain mgmt      PLAN:    Neurological:  # Acute postoperative pain   # Sedation  - RASS goal 0 to -1  - Sedation holiday in prep for PST  - Monitor neurological status. Delirium preventions and precautions.   - Pain: Fentanyl gtt           PRN: ibuprofen, tylenol  - Sedation: Propofol gtt, wean as tolerated  - Discontinue gtts when extubated      HEENT:  #s/p incision and drainage left sided submandibular, sublingual and lateral pharyngeal space abscess, extraction of #17 with placement of 4 penrose drains (3 at left neck, 1 in mouth) on 6/4/2024  - HOB>30  - Warm packs to face, NO ICE  - Change fluffs to neck PRN saturation, moderate bloody / purulent drainage from neck is expected  - OMFS will continue to evaluate for penrose drain removal pending clinical improvement / decreased drainage. Normally they are in place for 2-5 days.  - Resume normal oral hygiene  - Decadron completed      Pulmonary:   # Post operative ventilatory support  Vent Mode: CMV/AC  (Continuous Mandatory Ventilation/ Assist Control)  FiO2 (%): 35 %  Resp Rate (Set): 16 breaths/min  Tidal Volume (Set, mL): 450 mL  PEEP (cm H2O): 5  cmH2O  Inspiratory Pressure (cm H2O) (Drager Joyce): 1  Resp: 16  - PST, check for cuff leak  - Attempt extubation if able   - Supplemental oxygen to keep saturation above 92 %.  - Per OMFS attending note, plan for 24-48 hours intubation given severe infection and airway deviation     Cardiovascular:    # Hx HTN  # Hx HLD  - Monitor hemodynamic status.   - Restart PTA hydrochlorothiazide  - Restart PTA ASA     Gastroenterology/Nutrition:  # Protein calorie deficit malnutrition   - NPO while intubated  - OGT for medications. AXR shows adequate position.   - Discontinue with extubation  - Nutrition consult, appreciate recs  - Bedside swallow once extubated  - Ok for soft diet from OMFS perspective once extubated   - Scheduled bowel regimen     Fluids/Electrolytes/Renal:   # DOUG  - Monitor input and output  - Creatinine 1.22 from 1.18  - Trend daily BMP  - LR @ 100 for IV fluid hydration. Discontinue when taking in adequate PO  - Electrolyte replacement protocol    Endocrine:  # Stress hyperglycemia   # Steroid induced hyperglycemia   - Medium sliding scale for glucose management.   - Goal to keep BG< 180 for optimal wound healing      ID:  # Leukocytosis   # NSTI  - Leukocytosis 14.1 from 13.9  - Daily CBC  - Trend CRP (278 > 261)  - Continue clindamycin, zosyn, vancomycin  - Follow cultures    Positive cultures:  6/3 Blood Cultures: Pending  6/4 OR cultures: 2+ streptococcus constellatus      Heme:     # Acute blood loss anemia   - Hgb 9.7 from 10.2  - Transfuse if hgb <7.0 or signs/symptoms of hypoperfusion. Monitor and trend.   - Lovenox 40 mg daily     Musculoskeletal:  # Weakness and deconditioning of critical illness   - Physical and occupational therapy consult         General Cares/Prophylaxis:    DVT Prophylaxis: Pneumatic Compression Devices, Enoxaparin   GI Prophylaxis: PPI     Lines/ tubes/ drains:  - ETT - remove  - OG - remove   - Penrose x4  - PIV x 4 (remove left arm IVs)     Disposition:  -  "SICU    Patient seen, findings and plan discussed with surgical ICU staff, Dr. Calles.    Time spent on this Encounter   Billing:  I spent 35 minutes bedside and on the inpatient unit today managing the critical care of Giovanny Forbes  in relation to the issues listed in this note.      GABO Pollock CNP    ====================================  INTERVAL HISTORY:   NAEON.   ROS unable to obtain d/t sedation/intubated. RASS -3. Follows commands, WALLACE.    OBJECTIVE:   1. VITAL SIGNS:   Temp: 97.8  F (36.6  C) Temp src: Axillary BP: 96/59 Pulse: 58   Resp: 16 SpO2: 99 % O2 Device: Mechanical Ventilator     Weight: 118.6 kg (261 lb 7.5 oz)  Estimated body mass index is 33.12 kg/m  as calculated from the following:    Height as of an earlier encounter on 6/3/24: 1.892 m (6' 2.5\").    Weight as of this encounter: 118.6 kg (261 lb 7.5 oz).    Vent Mode: CMV/AC  (Continuous Mandatory Ventilation/ Assist Control)  FiO2 (%): 35 %  Resp Rate (Set): 16 breaths/min  Tidal Volume (Set, mL): 450 mL  PEEP (cm H2O): 5 cmH2O  Inspiratory Pressure (cm H2O) (Drager Joyce): 1  Resp: 16      2. INTAKE/ OUTPUT:   Intake/Output Summary (Last 24 hours) at 6/5/2024 0620  Last data filed at 6/5/2024 0300  Gross per 24 hour   Intake 3363.72 ml   Output 1430 ml   Net 1933.72 ml       3. PHYSICAL EXAMINATION:  General: Appears comfortable laying in bed  HEENT:ETT, OG, left neck dressing w/ x3 penrose drains  Neuro: NAD  Pulm/Resp: Clear breath sounds bilaterally without rhonchi, crackles or wheeze, breathing non-labored  CV: RRR  Abdomen: Soft, non-distended, non-tender, no rebound tenderness or guarding, no masses  Incisions/Skin: Small amount serosanguinous drainage to left neck dressing  MSK/Extremities: +1 peripheral edema, moving all extremities, peripheral pulses intact, calves compressible, extremities well perfused. RUE appears swollen, taught ( possible r/t IVs)    4. INVESTIGATIONS:   Arterial Blood Gases   Recent Labs "   Lab 06/04/24  0810   PH 7.49*   PCO2 34*   PO2 127*   HCO3 26     Complete Blood Count   Recent Labs   Lab 06/04/24  0629 06/03/24  1822   WBC 13.9* 18.6*   HGB 10.2* 12.4*    271     Basic Metabolic Panel  Recent Labs   Lab 06/05/24  0351 06/04/24  2323 06/04/24  1956 06/04/24  1555 06/04/24  0744 06/04/24  0629 06/04/24  0628 06/03/24  1822   NA  --   --   --   --   --  137  --  135   POTASSIUM  --   --   --   --   --  4.0  --  3.9   CHLORIDE  --   --   --   --   --  104  --  99   CO2  --   --   --   --   --  22  --  25   BUN  --   --   --   --   --  14.0  --  12.9   CR  --   --   --   --   --  1.18*  --  1.29*   * 135* 144* 138*   < > 133*   < > 122*    < > = values in this interval not displayed.     Liver Function Tests  No lab results found in last 7 days.  Pancreatic Enzymes  No lab results found in last 7 days.  Coagulation Profile  No lab results found in last 7 days.      5. RADIOLOGY:   Recent Results (from the past 24 hour(s))   XR Chest Port 1 View    Narrative    Exam: XR CHEST PORT 1 VIEW, 6/4/2024 7:09 AM    Indication: confirm ETT placement    Comparison: None    Findings:   Portable semiupright and supine AP views of the chest. Endotracheal  tube tip in the mid thoracic trachea approximately 2.6 cm above the  claudia. Gastric tube with tip and sidehole projecting in the stomach.    Trachea is midline. Heart size is normal. Low lung volumes. No focal  pulmonary opacity, pleural effusion, or pneumothorax.      Impression    Impression: Endotracheal tube tip in the lower thoracic trachea  approximately 2.6 cm above the claudia. No acute airspace disease.    I have personally reviewed the examination and initial interpretation  and I agree with the findings.    NIKOLE ADAM MD         SYSTEM ID:  [id number]   XR Abdomen Port 1 View    Narrative    XR ABDOMEN PORT 1 VIEW  6/4/2024 7:09 AM     HISTORY:  Confirm OGT placement     COMPARISON:  None.    TECHNIQUE: Single supine radiograph of  the abdomen.     FINDINGS:   Enteric tube tip terminates in the stomach with sidehole projecting  over the stomach as well.    No obstructive bowel gas pattern. There are no abnormal calcifications  or evidence of organomegaly. Small to moderate stool burden in the  colon. The visualized lung bases are clear.  No acute osseous  abnormalities.      Impression    IMPRESSION:     Enteric tube tip and sidehole projects over the stomach.    I have personally reviewed the examination and initial interpretation  and I agree with the findings.    CHEVY AYON MD         SYSTEM ID:  [id number]       =========================================

## 2024-06-05 NOTE — PLAN OF CARE
Major Shift Events:  Sedated on prop and fent. RASS -2. PERRLA. Follows commands. Able to make needs known. Moves all extremities. Sinus rhythm, normotensive. Afebrile. ETT in place, CMV settings. Scant secretions both orally and inline. Lungs clear/diminished. TF increase to 30, goal of 50. Standard FWF. Straight cath x1 for 500 mL out. No BM overnight, bowel reg in place. Penrose drain x 3 in neck, minimal serosanguinous output. Penrose in L mouth w/minimal output. LR @ 150 for MIVF.    Plan: Wean sedating and vent. Monitor and treat per POC.   For vital signs and complete assessments, please see documentation flowsheets.

## 2024-06-05 NOTE — PROGRESS NOTES
ORAL & MAXILLOFACIALSURGERY   INPATIENT PROGRESS NOTE    Giovanny Forbes Jr.   : 1965   Sex: male   MRN: [id number]                  2024 5:52 AM    ASSESSMENT:  Giovanny Forbes Jr. is a 58 year old y.o. male with h/o HTN who presented to OSH ED with left sided facial swelling, intubated for airway protection is s/p incision and drainage left sided submandibular, sublingual and lateral pharyngeal space abscess, extraction of #17 with placement of 4 penrose drains (3 at left neck, 1 in mouth) on 2024 (POD #1), overall progressing well.     PLAN:  - Continue IV abx, Zosyn, Vancomycin, clindamycin  - Pain mgmt per ICU team  - Wean sedation as tolerated, PST today if able  - OK to extubate per OMFS  - At this time, no other surgical intervention or additional imaging warranted  - Cultures pending following  - HOB>30  - Warm packs to face, NO ICE  - Change fluffs to neck PRN saturation, moderate bloody / purulent drainage from neck is expected  - OMFS will continue to evaluate for penrose drain removal pending clinical improvement / decreased drainage. Normally they are in place for 2-5 days.  - Resume normal oral hygiene  - Ok for soft diet from OMFS perspective  --Call OMFS on call with questions or concerns    Plan of care d/w RN at the time and with Sonia, present at bedside.  Pt evaluated with [facility] Chief Resident, Dr. Brewer, who will discuss the plan with Dr. Townsend, attending.      Vaibhav Whitt DMD  Oral and Maxillofacial Surgery Resident  __________________________________________________________  SUBJECTIVE:  Patient found in bed. Sedated on prop/fent, following commands and able to make needs. Overall NAEON. ICU deepen the sedation to perform straight cath this am. Left sided swelling is improving and still having minimal serosanguinous drainage from extra-oral penrose. Soft on palpation at retromandibular on the left side with minimal output from the intra-oral drain also.    .  OBJECTIVE:  VITALS:  Patient Vitals for the past 24 hrs:   BP Temp Temp src Pulse Resp SpO2 Weight   06/05/24 0500 96/59 -- -- 58 16 99 % --   06/05/24 0400 96/59 97.8  F (36.6  C) Axillary 57 16 99 % --   06/05/24 0300 99/59 -- -- 65 16 99 % --   06/05/24 0200 105/61 -- -- 74 16 99 % --   06/05/24 0100 -- -- -- 78 19 100 % --   06/05/24 0000 101/68 98  F (36.7  C) Axillary 67 16 99 % --   06/04/24 2300 106/67 -- -- 67 16 98 % --   06/04/24 2200 97/58 -- -- 63 16 98 % --   06/04/24 2145 97/58 -- -- 64 16 99 % --   06/04/24 2130 91/58 -- -- 62 16 100 % --   06/04/24 2115 90/55 -- -- 63 16 100 % --   06/04/24 2100 (!) 88/53 -- -- 64 16 99 % --   06/04/24 2000 99/66 98  F (36.7  C) Axillary 66 16 99 % --   06/04/24 1900 100/63 -- -- 70 -- 99 % --   06/04/24 1800 114/67 -- -- 82 -- 99 % --   06/04/24 1700 (!) 129/93 -- -- 86 -- 100 % --   06/04/24 1600 112/71 97.7  F (36.5  C) Bladder 67 16 100 % --   06/04/24 1500 97/60 98.1  F (36.7  C) -- 69 16 100 % --   06/04/24 1400 106/65 99  F (37.2  C) -- 77 18 100 % --   06/04/24 1300 126/78 99.5  F (37.5  C) -- 84 16 100 % --   06/04/24 1200 118/70 99  F (37.2  C) Bladder 77 16 99 % --   06/04/24 1100 -- 99.7  F (37.6  C) -- 81 -- 100 % --   06/04/24 1000 -- 100.4  F (38  C) -- 89 18 100 % --   06/04/24 0900 -- 100.2  F (37.9  C) -- 85 16 100 % --   06/04/24 0800 -- 99.5  F (37.5  C) Bladder 76 16 100 % --   06/04/24 0600 -- -- -- 81 -- -- 118.6 kg (261 lb 7.5 oz)         Exam:  Neuro: Sedated on propofol and fentanyl gtts   HEENT: Resolving left submandibular and retromandibular swelling soft and non fluctuant, Able to palpate the inferior border of mandible throughout. 3 penroses drains with minimal serosanguinous drainage, on palpation we can express a little bit more. Dressing slightly saturated with serosanguinous drainage and neuro netting present   Oral: Left posterior mandible with 1 penrose drain and minimal output. Secured with silk suture. Extraction socket  hemostatic, left lateral pharyngeal and retromolar soft and non-distended.   Skin: no rashes or lesions. Hemostatic. Dressing/penroses present  CV: SR, SBP goal maintained without intervention. Afebrile.   Pulm: CMV settings 40%/16/450/5, LS clear, scant secretions from ETT, moderate secretions from oral cavity.   Abd:OG with TF started at 10 ml/hr and standard flushes. No BM this shift. Garg removed, due to void.   Skin: Left neck incision with penrose drain x 3, penrose drain x 1 in mouth.     Meds:  Current Facility-Administered Medications   Medication Dose Route Frequency Provider Last Rate Last Admin    chlorhexidine (PERIDEX) 0.12 % solution 15 mL  15 mL Mouth/Throat Q12H Kathie Chilel MD   15 mL at 06/04/24 1949    clindamycin (CLEOCIN) 900 mg in 50 mL D5W intermittent infusion  900 mg Intravenous Q8H Kathie Chilel  mL/hr at 06/04/24 2326 900 mg at 06/04/24 2326    dextrose 10% infusion   Intravenous Continuous PRN Iveth Eid PA-C        glucose gel 15-30 g  15-30 g Oral Q15 Min PRN Kathie Chilel MD        Or    dextrose 50 % injection 25-50 mL  25-50 mL Intravenous Q15 Min PRN Kathie Chilel MD        Or    glucagon injection 1 mg  1 mg Subcutaneous Q15 Min PRN Kathie Chilel MD        enoxaparin ANTICOAGULANT (LOVENOX) injection 40 mg  40 mg Subcutaneous Q24H Iveth Eid PA-C   40 mg at 06/04/24 1230    fentaNYL (SUBLIMAZE) infusion   mcg/hr Intravenous Continuous Shamir Light DO 1.5 mL/hr at 06/05/24 0500 75 mcg/hr at 06/05/24 0500    insulin aspart (NovoLOG) injection (RAPID ACTING)  1-7 Units Subcutaneous Q4H Kathie Chilel MD   1 Units at 06/04/24 2010    lactated ringers infusion   Intravenous Continuous Kathie Chilel  mL/hr at 06/05/24 0550 New Bag at 06/05/24 0550    multivitamins w/minerals liquid 15 mL  15 mL Per Feeding Tube Daily Iveth Eid PA-C   15 mL at 06/04/24 1525    naloxone (NARCAN)  injection 0.2 mg  0.2 mg Intravenous Q2 Min PRN Uppgaard, Radha, DDS        Or    naloxone (NARCAN) injection 0.4 mg  0.4 mg Intravenous Q2 Min PRN Uppgaard, Radha, DDS        Or    naloxone (NARCAN) injection 0.2 mg  0.2 mg Intramuscular Q2 Min PRN Uppgaard, Radha, DDS        Or    naloxone (NARCAN) injection 0.4 mg  0.4 mg Intramuscular Q2 Min PRN Uppgaard, Radha, DDS        pantoprazole (PROTONIX) 2 mg/mL suspension 40 mg  40 mg Per Feeding Tube QAM AC Kathie Chilel MD        piperacillin-tazobactam (ZOSYN) 4.5 g vial to attach to  mL bag  4.5 g Intravenous Q6H UpRadha sewell DDS   4.5 g at 06/05/24 0126    polyethylene glycol (MIRALAX) Packet 17 g  17 g Oral Daily Kathie Chilel MD   17 g at 06/04/24 0841    propofol (DIPRIVAN) infusion  5-75 mcg/kg/min (Dosing Weight) Intravenous Continuous Shamir Light DO 23.7 mL/hr at 06/05/24 0546 35 mcg/kg/min at 06/05/24 0546    protein modular (PROSOURCE TF Free) 1 packet  1 packet Per Feeding Tube 4x Daily Iveth Eid PA-C   1 packet at 06/04/24 1949    senna-docusate (SENOKOT-S/PERICOLACE) 8.6-50 MG per tablet 1 tablet  1 tablet Oral BID Kathie Chilel MD   1 tablet at 06/04/24 1949    Or    senna-docusate (SENOKOT-S/PERICOLACE) 8.6-50 MG per tablet 2 tablet  2 tablet Oral BID Kathie Chilel MD        vancomycin (VANCOCIN) 2,000 mg in sodium chloride 0.9 % 500 mL intermittent infusion  2,000 mg Intravenous Q24H Shamir Light DO   2,000 mg at 06/04/24 2106      Labs:    Admission on 06/03/2024   Component Date Value Ref Range Status    Hold Specimen 06/03/2024 JI   Final    Hold Specimen 06/03/2024 JI   Final    Hold Specimen 06/03/2024 [facility]   Final    Hold Specimen 06/03/2024 [facility]   Final    Hold Specimen 06/03/2024 [facility]   Final    ABO/RH(D) 06/03/2024 B POS   Final    Antibody Screen 06/03/2024 Negative  Negative Final    SPECIMEN EXPIRATION DATE 06/03/2024 [id number]   Final    Culture 06/04/2024 No  anaerobic organisms isolated after 1 day   Preliminary    GS Culture 06/04/2024 See corresponding culture for results   Final    Gram Stain Result 06/04/2024 1+ Gram positive cocci (A)   Final    Gram Stain Result 06/04/2024 1+ WBC seen (A)   Final    Culture 06/04/2024 No growth after 1 day   Preliminary    Culture 06/04/2024 No growth, less than 1 day   Preliminary    WBC Count 06/04/2024 13.9 (H)  4.0 - 11.0 10e3/uL Final    RBC Count 06/04/2024 3.61 (L)  4.40 - 5.90 10e6/uL Final    Hemoglobin 06/04/2024 10.2 (L)  13.3 - 17.7 g/dL Final    Hematocrit 06/04/2024 30.9 (L)  40.0 - 53.0 % Final    MCV 06/04/2024 86  78 - 100 fL Final    MCH 06/04/2024 28.3  26.5 - 33.0 pg Final    MCHC 06/04/2024 33.0  31.5 - 36.5 g/dL Final    RDW 06/04/2024 14.5  10.0 - 15.0 % Final    Platelet Count 06/04/2024 237  150 - 450 10e3/uL Final    Sodium 06/04/2024 137  135 - 145 mmol/L Final    Reference intervals for this test were updated on 09/26/2023 to more accurately reflect our healthy population. There may be differences in the flagging of prior results with similar values performed with this method. Interpretation of those prior results can be made in the context of the updated reference intervals.     Potassium 06/04/2024 4.0  3.4 - 5.3 mmol/L Final    Chloride 06/04/2024 104  98 - 107 mmol/L Final    Carbon Dioxide (CO2) 06/04/2024 22  22 - 29 mmol/L Final    Anion Gap 06/04/2024 11  7 - 15 mmol/L Final    Urea Nitrogen 06/04/2024 14.0  8.0 - 23.0 mg/dL Final    Creatinine 06/04/2024 1.18 (H)  0.67 - 1.17 mg/dL Final    GFR Estimate 06/04/2024 71  >60 mL/min/1.73m2 Final    Calcium 06/04/2024 8.6 (L)  8.8 - 10.2 mg/dL Final    Glucose 06/04/2024 133 (H)  70 - 99 mg/dL Final    Lactic Acid 06/04/2024 0.9  0.7 - 2.0 mmol/L Final    GLUCOSE BY METER POCT 06/04/2024 129 (H)  70 - 99 mg/dL Final    pH Arterial 06/04/2024 7.49 (H)  7.35 - 7.45 Final    pCO2 Arterial 06/04/2024 34 (L)  35 - 45 mm Hg Final    pO2 Arterial  06/04/2024 127 (H)  80 - 105 mm Hg Final    FIO2 06/04/2024 40   Final    Bicarbonate Arterial 06/04/2024 26  21 - 28 mmol/L Final    Base Excess/Deficit Arterial 06/04/2024 2.8  -3.0 - 3.0 mmol/L Final    Joe's Test 06/04/2024 [facility]   Final    Oxyhemoglobin Arterial 06/04/2024 98  92 - 100 % Final    O2 Sat, Arterial 06/04/2024 99.9 (H)  96.0 - 97.0 % Final    CRP Inflammation 06/04/2024 278.00 (H)  <5.00 mg/L Final    GLUCOSE BY METER POCT 06/04/2024 130 (H)  70 - 99 mg/dL Final    GLUCOSE BY METER POCT 06/04/2024 116 (H)  70 - 99 mg/dL Final    GLUCOSE BY METER POCT 06/04/2024 138 (H)  70 - 99 mg/dL Final    GLUCOSE BY METER POCT 06/04/2024 144 (H)  70 - 99 mg/dL Final    GLUCOSE BY METER POCT 06/04/2024 135 (H)  70 - 99 mg/dL Final    GLUCOSE BY METER POCT 06/05/2024 137 (H)  70 - 99 mg/dL Final       Imaging:    Results for orders placed or performed during the hospital encounter of 06/03/24   XR Chest Port 1 View    Narrative    Exam: XR CHEST PORT 1 VIEW, 6/4/2024 7:09 AM    Indication: confirm ETT placement    Comparison: None    Findings:   Portable semiupright and supine AP views of the chest. Endotracheal  tube tip in the mid thoracic trachea approximately 2.6 cm above the  claudia. Gastric tube with tip and sidehole projecting in the stomach.    Trachea is midline. Heart size is normal. Low lung volumes. No focal  pulmonary opacity, pleural effusion, or pneumothorax.      Impression    Impression: Endotracheal tube tip in the lower thoracic trachea  approximately 2.6 cm above the claudia. No acute airspace disease.    I have personally reviewed the examination and initial interpretation  and I agree with the findings.    NIKOLE ADAM MD         SYSTEM ID:  [id number]   XR Abdomen Port 1 View    Narrative    XR ABDOMEN PORT 1 VIEW  6/4/2024 7:09 AM     HISTORY:  Confirm OGT placement     COMPARISON:  None.    TECHNIQUE: Single supine radiograph of the abdomen.     FINDINGS:   Enteric tube tip terminates  in the stomach with sidehole projecting  over the stomach as well.    No obstructive bowel gas pattern. There are no abnormal calcifications  or evidence of organomegaly. Small to moderate stool burden in the  colon. The visualized lung bases are clear.  No acute osseous  abnormalities.      Impression    IMPRESSION:     Enteric tube tip and sidehole projects over the stomach.    I have personally reviewed the examination and initial interpretation  and I agree with the findings.    CHEVY AYON MD         SYSTEM ID:  [id number]         Vaibhav Whitt DMD  [facility] resident

## 2024-06-06 ENCOUNTER — APPOINTMENT (OUTPATIENT)
Dept: OCCUPATIONAL THERAPY | Facility: CLINIC | Age: 59
DRG: 143 | End: 2024-06-06
Payer: COMMERCIAL

## 2024-06-06 VITALS
HEART RATE: 60 BPM | SYSTOLIC BLOOD PRESSURE: 141 MMHG | DIASTOLIC BLOOD PRESSURE: 66 MMHG | WEIGHT: 265.5 LBS | BODY MASS INDEX: 33.63 KG/M2 | TEMPERATURE: 98.1 F | OXYGEN SATURATION: 97 % | RESPIRATION RATE: 16 BRPM

## 2024-06-06 LAB
ANION GAP SERPL CALCULATED.3IONS-SCNC: 8 MMOL/L (ref 7–15)
BACTERIA ABSC ANAEROBE+AEROBE CULT: ABNORMAL
BUN SERPL-MCNC: 30.6 MG/DL (ref 8–23)
CALCIUM SERPL-MCNC: 8.8 MG/DL (ref 8.8–10.2)
CHLORIDE SERPL-SCNC: 104 MMOL/L (ref 98–107)
CREAT SERPL-MCNC: 1.29 MG/DL (ref 0.67–1.17)
DEPRECATED HCO3 PLAS-SCNC: 25 MMOL/L (ref 22–29)
EGFRCR SERPLBLD CKD-EPI 2021: 63 ML/MIN/1.73M2
ERYTHROCYTE [DISTWIDTH] IN BLOOD BY AUTOMATED COUNT: 14.8 % (ref 10–15)
GLUCOSE BLDC GLUCOMTR-MCNC: 103 MG/DL (ref 70–99)
GLUCOSE BLDC GLUCOMTR-MCNC: 106 MG/DL (ref 70–99)
GLUCOSE BLDC GLUCOMTR-MCNC: 93 MG/DL (ref 70–99)
GLUCOSE BLDC GLUCOMTR-MCNC: 99 MG/DL (ref 70–99)
GLUCOSE SERPL-MCNC: 100 MG/DL (ref 70–99)
HCT VFR BLD AUTO: 30.4 % (ref 40–53)
HGB BLD-MCNC: 9.9 G/DL (ref 13.3–17.7)
MAGNESIUM SERPL-MCNC: 2.4 MG/DL (ref 1.7–2.3)
MCH RBC QN AUTO: 28.2 PG (ref 26.5–33)
MCHC RBC AUTO-ENTMCNC: 32.6 G/DL (ref 31.5–36.5)
MCV RBC AUTO: 87 FL (ref 78–100)
PHOSPHATE SERPL-MCNC: 2.3 MG/DL (ref 2.5–4.5)
PLATELET # BLD AUTO: 312 10E3/UL (ref 150–450)
POTASSIUM SERPL-SCNC: 3.9 MMOL/L (ref 3.4–5.3)
RBC # BLD AUTO: 3.51 10E6/UL (ref 4.4–5.9)
SODIUM SERPL-SCNC: 137 MMOL/L (ref 135–145)
WBC # BLD AUTO: 14.9 10E3/UL (ref 4–11)

## 2024-06-06 PROCEDURE — 250N000009 HC RX 250: Performed by: DENTIST

## 2024-06-06 PROCEDURE — 97535 SELF CARE MNGMENT TRAINING: CPT | Mod: GO | Performed by: OCCUPATIONAL THERAPIST

## 2024-06-06 PROCEDURE — 999N000147 HC STATISTIC PT IP EVAL DEFER

## 2024-06-06 PROCEDURE — 250N000011 HC RX IP 250 OP 636

## 2024-06-06 PROCEDURE — 80048 BASIC METABOLIC PNL TOTAL CA: CPT

## 2024-06-06 PROCEDURE — 258N000003 HC RX IP 258 OP 636

## 2024-06-06 PROCEDURE — 84100 ASSAY OF PHOSPHORUS: CPT

## 2024-06-06 PROCEDURE — 250N000013 HC RX MED GY IP 250 OP 250 PS 637

## 2024-06-06 PROCEDURE — 85027 COMPLETE CBC AUTOMATED: CPT

## 2024-06-06 PROCEDURE — 258N000003 HC RX IP 258 OP 636: Performed by: DENTIST

## 2024-06-06 PROCEDURE — 36415 COLL VENOUS BLD VENIPUNCTURE: CPT

## 2024-06-06 PROCEDURE — 250N000013 HC RX MED GY IP 250 OP 250 PS 637: Performed by: STUDENT IN AN ORGANIZED HEALTH CARE EDUCATION/TRAINING PROGRAM

## 2024-06-06 PROCEDURE — 83735 ASSAY OF MAGNESIUM: CPT

## 2024-06-06 PROCEDURE — 250N000011 HC RX IP 250 OP 636: Performed by: DENTIST

## 2024-06-06 RX ORDER — CHLORHEXIDINE GLUCONATE ORAL RINSE 1.2 MG/ML
SOLUTION DENTAL
Qty: 473 ML | Refills: 0 | Status: SHIPPED | OUTPATIENT
Start: 2024-06-06

## 2024-06-06 RX ORDER — ACETAMINOPHEN 500 MG
500-1000 TABLET ORAL EVERY 6 HOURS PRN
Qty: 28 TABLET | Refills: 0 | Status: SHIPPED | OUTPATIENT
Start: 2024-06-06

## 2024-06-06 RX ORDER — IBUPROFEN 600 MG/1
600 TABLET, FILM COATED ORAL EVERY 6 HOURS PRN
Qty: 28 TABLET | Refills: 0 | Status: SHIPPED | OUTPATIENT
Start: 2024-06-06

## 2024-06-06 RX ORDER — ONDANSETRON 4 MG/1
4 TABLET, ORALLY DISINTEGRATING ORAL EVERY 8 HOURS PRN
Qty: 4 TABLET | Refills: 0 | Status: SHIPPED | OUTPATIENT
Start: 2024-06-06

## 2024-06-06 RX ORDER — AMOXICILLIN 250 MG
1-2 CAPSULE ORAL 2 TIMES DAILY
Qty: 30 TABLET | Refills: 0 | Status: SHIPPED | OUTPATIENT
Start: 2024-06-06

## 2024-06-06 RX ORDER — OXYCODONE HYDROCHLORIDE 5 MG/1
5 TABLET ORAL EVERY 6 HOURS PRN
Qty: 8 TABLET | Refills: 0 | Status: SHIPPED | OUTPATIENT
Start: 2024-06-06 | End: 2024-06-09

## 2024-06-06 RX ORDER — POTASSIUM PHOS IN 0.9 % NACL 15MMOL/250
15 PLASTIC BAG, INJECTION (ML) INTRAVENOUS ONCE
Qty: 250 ML | Refills: 0 | Status: COMPLETED | OUTPATIENT
Start: 2024-06-06 | End: 2024-06-06

## 2024-06-06 RX ORDER — PANTOPRAZOLE SODIUM 40 MG/1
40 TABLET, DELAYED RELEASE ORAL
Status: DISCONTINUED | OUTPATIENT
Start: 2024-06-07 | End: 2024-06-06 | Stop reason: HOSPADM

## 2024-06-06 RX ADMIN — POTASSIUM PHOSPHATE, MONOBASIC AND POTASSIUM PHOSPHATE, DIBASIC 15 MMOL: 224; 236 INJECTION, SOLUTION, CONCENTRATE INTRAVENOUS at 08:16

## 2024-06-06 RX ADMIN — HYDROCHLOROTHIAZIDE 25 MG: 25 TABLET ORAL at 08:11

## 2024-06-06 RX ADMIN — CLINDAMYCIN IN 5 PERCENT DEXTROSE 900 MG: 18 INJECTION, SOLUTION INTRAVENOUS at 06:51

## 2024-06-06 RX ADMIN — PIPERACILLIN AND TAZOBACTAM 4.5 G: 4; .5 INJECTION, POWDER, LYOPHILIZED, FOR SOLUTION INTRAVENOUS at 00:45

## 2024-06-06 RX ADMIN — PIPERACILLIN AND TAZOBACTAM 4.5 G: 4; .5 INJECTION, POWDER, LYOPHILIZED, FOR SOLUTION INTRAVENOUS at 14:04

## 2024-06-06 RX ADMIN — ASPIRIN 81 MG CHEWABLE TABLET 81 MG: 81 TABLET CHEWABLE at 08:11

## 2024-06-06 RX ADMIN — ENOXAPARIN SODIUM 40 MG: 40 INJECTION SUBCUTANEOUS at 13:59

## 2024-06-06 RX ADMIN — ACETAMINOPHEN 650 MG: 325 TABLET, FILM COATED ORAL at 08:57

## 2024-06-06 RX ADMIN — VANCOMYCIN HYDROCHLORIDE 1250 MG: 10 INJECTION, POWDER, LYOPHILIZED, FOR SOLUTION INTRAVENOUS at 08:15

## 2024-06-06 RX ADMIN — PIPERACILLIN AND TAZOBACTAM 4.5 G: 4; .5 INJECTION, POWDER, LYOPHILIZED, FOR SOLUTION INTRAVENOUS at 06:51

## 2024-06-06 ASSESSMENT — ACTIVITIES OF DAILY LIVING (ADL)
ADLS_ACUITY_SCORE: 30
ADLS_ACUITY_SCORE: 31
ADLS_ACUITY_SCORE: 30
ADLS_ACUITY_SCORE: 30
ADLS_ACUITY_SCORE: 31
ADLS_ACUITY_SCORE: 30

## 2024-06-06 NOTE — PHARMACY-VANCOMYCIN DOSING SERVICE
"Pharmacy Vancomycin Note  Date of Service 2024  Patient's  1965   58 year old, male    Indication: Sepsis and Skin and Soft Tissue Infection (NSTI)  Day of Therapy: 3  Current vancomycin regimen:  2000 mg IV q24h  Current vancomycin monitoring method: AUC  Current vancomycin therapeutic monitoring goal: 400-600 mg*h/L    [employer] Prediction of Current Vancomycin Regimen  Regimen: 2000 mg IV every 24 hours.  Start time: 21:00 on 2024  Exposure target: AUC24 (range)400-600 mg/L.hr   AUC24,ss: 444 mg/L.hr  Probability of AUC24 > 400: 77 %  Ctrough,ss: 12 mg/L  Probability of Ctrough,ss > 20: 2 %  Probability of nephrotoxicity (Lodise ALFREDO ): 7 %      Current estimated CrCl = Estimated Creatinine Clearance: 88.4 mL/min (A) (based on SCr of 1.22 mg/dL (H)).    Creatinine for last 3 days  6/3/2024:  6:22 PM Creatinine 1.29 mg/dL  2024:  6:29 AM Creatinine 1.18 mg/dL  2024:  6:44 AM Creatinine 1.22 mg/dL    Recent Vancomycin Levels (past 3 days)  2024:  6:18 PM Vancomycin 11.1 ug/mL    Vancomycin IV Administrations (past 72 hours)                     vancomycin (VANCOCIN) 2,000 mg in sodium chloride 0.9 % 500 mL intermittent infusion (mg) 2,000 mg New Bag 24    vancomycin (VANCOCIN) 2,500 mg in 0.9% NaCl 500 mL intermittent infusion (mg) 2,500 mg New Bag 24                    Nephrotoxins and other renal medications (From now, onward)      Start     Dose/Rate Route Frequency Ordered Stop    24 1930  vancomycin (VANCOCIN) 1,250 mg in 0.9% NaCl 250 mL intermittent infusion         1,250 mg  over 90 Minutes Intravenous EVERY 12 HOURS 24 1907      24 0956  ibuprofen (ADVIL/MOTRIN) tablet 600 mg         600 mg Oral EVERY 6 HOURS PRN 24 1003      24 0700  piperacillin-tazobactam (ZOSYN) 4.5 g vial to attach to  mL bag        Note to Pharmacy: For [facility], [facility] and [facility]: For Zosyn-naive patients, use the \"Zosyn initial dose + extended " "infusion\" order panel.    4.5 g  over 30 Minutes Intravenous EVERY 6 HOURS 06/04/24 0640                 Contrast Orders - past 72 hours (72h ago, onward)      None            Interpretation of levels and current regimen:  Vancomycin level is reflective of AUC less than 400 (currently 377, would be at low end of 400-600 range at steady state)    Has serum creatinine changed greater than 50% in last 72 hours: No    Urine output:  good urine output    Renal Function: Stable    [employer] Prediction of Planned New Vancomycin Regimen  Regimen: 1250 mg IV every 12 hours.  Start time: 20:00 on 06/05/2024  Exposure target: AUC24 (range)400-600 mg/L.hr   AUC24,ss: 548 mg/L.hr  Probability of AUC24 > 400: 99 %  Ctrough,ss: 18.2 mg/L  Probability of Ctrough,ss > 20: 30 %  Probability of nephrotoxicity (Lodise ALFREDO 2009): 14 %      Plan:  Increase Dose to 1250 mg IV q12h  Vancomycin monitoring method: AUC  Vancomycin therapeutic monitoring goal: 400-600 mg*h/L  Pharmacy will check vancomycin levels as appropriate in 1-3 Days.  Serum creatinine levels will be ordered daily for the first week of therapy and at least twice weekly for subsequent weeks.    Joan Rice [facility]    "

## 2024-06-06 NOTE — PLAN OF CARE
Occupational Therapy Discharge Summary    Reason for therapy discharge:    All goals and outcomes met, no further needs identified.    Progress towards therapy goal(s). See goals on Care Plan in [facility] electronic health record for goal details.  Goals met    Therapy recommendation(s):    No further therapy is recommended.

## 2024-06-06 NOTE — PLAN OF CARE
Physical Therapy: Orders received. Chart reviewed and discussed with care team.? Physical Therapy not indicated due to patient demonstrating functional mobility near baseline with no acute PT needs per OT. OT following and will address impairments with functional mobility.? Defer discharge recommendations to OT and medical team.? Will complete orders.  Please re-consult PT if any new needs arise.

## 2024-06-06 NOTE — DISCHARGE INSTRUCTIONS
HOME CARE INSTRUCTIONS FOLLOWING SURGERY    CARE OF THE MOUTH    1. WOUND CARE: Do not disturb the wound. Do not rinse your mouth or use a mouthwash for the day of surgery. If you smoke, please refrain from doing so for at least 4 days. Avoid probing the wound. A waterpick should not be used during the early healing period. The above activities will cause complications with wound healing.     2. SWELLING: Facial swelling occurs following most dental extractions and oral surgery procedures. To help minimize swelling, apply warm compress to the face for 20 minutes; remove for 20 minutes; alternating back and forth throughout the first day after surgery.   The maximum facial swelling normally occurs on days 2-5 following surgery. Once present, it can remain swollen for 7-10 days after surgery.     3. PAIN: A variable amount of pain follows most extractions or oral surgical procedures. If you are given a prescription for pain medication please use as directed. Many times analgesics are prescribed on a scheduled basis. Excessive or increased pain after the third day following surgery is not normal. Should this occur, please call the clinic and set up a follow up appointment if not already scheduled.     4. BLEEDING: A slight amount of bleeding or oozing is expected up to 24 hours after surgery. Theses conditions are no cause for alarm. Following your oral surgery, a small sterile gauze compress may be placed on the wound and we ask that you maintain steady biting pressure on the gauze for 1 hour.      5. NAUSEA: Nausea is not an uncommon event after surgery, and it is sometimes cause by narcotic pain medication. Nausea may be reduce by taking pills with food or water. Attempt to keep drinking small amounts of clear fluids if you find the nausea does not improve. Cola drinks with less carbonation may help with nausea.     6. DISCOLORATION: Facial discoloration (black and blue bruising) often follows many extraction and  "oral surgery procedures. Discoloration is normal and is no cause for alarm. It may persist for several weeks.     7. JAW STIFFNESS: For several days following most oral procedures, the jaw may become somewhat stiff. Should jaw stiffness progress or persist after one week, notify you oral surgeon.     8. DIET: Strict liquid, non-chew diet must be followed for 7 days. It is extremely important to maintain adequate hydration for normal healing. Examples of soft foods include: masked potatoes, soups, applesauce, jell-o, ice cream, overcooked pasta.     Please use the Internet to look up liquid diets to help with ideas     9. MOUTH RINSES: The day following surgery begin using warm salt water rinses after meals and several times during the day as directed by your oral surgeon. One-half teaspoon of table salt in a full glass of warm water is recommended.       10. PHYSICAL ACTIVITY/REST: Keep physical activity to a minimum. Avoid athletic and strenuous activities and get plenty of rest.    11. STICHES: Following many extractions and oral surgery procedures, stiches as placed in the gums. Your doctor will advise you if a return appointment is needed for stitch removal.    12. DRY SOCKET: Despite the best of care, a small percentage of patients who have teeth removed will develop a \"Dry Socket\". A \"Dry Socket\" is a condition where the wound healing is interrupted. This condition usually develops 3-10 days after your extraction. Typically, patients will note increased pain at the extraction site. The aching pain may worsen and spread to the ear and even eye area of the face. If you exhibit these symptoms please call our clinic and schedule a follow up appointment. \"Dry Sockets\" are an easily treatable condition.      13. BRUSHING: Resume your normal oral hygiene routine within 24 hours following surgery. Soreness ans swelling may not permit vigorous brushing of all areas, but please make every effort to clean your teeth " within comfort.     14. NUMBNESS: Local anesthetics may be effective for as long as 24-48 hours. Should you experience numbness beyone this period, notify your oral surgeon.       AFTER HOURS CONTACT FOR EMERGENCIES:  Please call the [facility] switchboard at [phone] and ask to have the Oral and Maxillofacial Surgery Resident On-call paged.     It is our desire to make your recovery as smooth as possible. These instructions are given to assist you following your surgery. If you have any questions please call the clinic at [phone].

## 2024-06-06 NOTE — PLAN OF CARE
Major Shift Events:      A&Ox4, follows commands. Neuros intact. LS clear, sats > 95% on RA. SR w/ occasional PVCs, HR in 80s. Sys BP goal < 170. Palpable pulses, afebrile. LUE slightly swollen from old IV infiltration site. 1x BM overnight, voids spontaneously w/o difficulty using urinal. 3x penrose drains from left neck incision, covered w/ dressing. 1x penrose inside mouth. 2x PIVs w/ TKO for abx.    Plan: continue with plan of care. Notify team with any changes/updates.    For vital signs and complete assessments, please see documentation flowsheets.

## 2024-06-06 NOTE — PLAN OF CARE
Transferred to: [address]  at 0600  Belongings: w/ pt and other belongings w/ family  Brown removed? No - no brown in place  Central line removed? No, no central line in place  Chart and medications sent with patient Yes  Family notified: Yes

## 2024-06-06 NOTE — PLAN OF CARE
BP (!) 141/66 (BP Location: Right arm)   Pulse 60   Temp 98.1  F (36.7  C) (Oral)   Resp 16   Wt 120.4 kg (265 lb 8 oz)   SpO2 97%   BMI 33.63 kg/m      Shift: 1099-4658  Isolation Status: none  VS: stable on room air, afebrile  Neuro: Aox4  Behaviors: calm, cooperative  BG: Q4 hours (has not required insulin)  Labs: RN managed K, mag, phos. Replaced Phos  Respiratory: WDL  Cardiac: on tele. WDL  Pain/Nausea: reports pain 7/10. Denies nausea   PRN: tylenol x1  Diet: Soft/mechanical   IV Access: Right IV x2  Infusion(s): clindamycin, vanco, zosyn, potassium phos  Lines/Drains: penrose drains x3 in left mandible, 1 penrose drain in mouth   GI/: BM 6/5, voiding spontaneously without difficulty   Skin: WDL   Mobility: SBA  Plan: Patient set to discharge home

## 2024-06-06 NOTE — PLAN OF CARE
Discharge instructions reviewed with patient and spouse. PIVs removed. Meds picked up from pharmacy. Extra drain care supplies sent home with patient. Pt discharged home with spouse.

## 2024-06-06 NOTE — PROGRESS NOTES
Antimicrobial Stewardship Team Note    Antimicrobial Stewardship Program - A joint venture between [facility] and  Physicians to optimize antibiotic management.  NOT a formal consult - Restricted Antimicrobial Review     Patient: Giovanny Forbes Jr.  MRN: [id number]  Allergies: Patient has no known allergies.    Brief Summary: Giovanny Forbes Jr. is a 58 year old male with a past medical history significant for HTN and HLD who was admitted on 6/3/2024 for further management of necrotizing fasciitis from the left cheek to the left side of the neck.      History of Present Illness: Patient was initially transferred to [facility] ED after evaluation of left-sided facial swelling at the dentist office. Patient reported worsening swelling and difficulty handling secretions. CT neck from [facility] showed lucency surrounding the unerupted malpositioned left third mandibular molar contiguous with moderate soft tissue inflammatory changes involving the floor mouth as well as the submandibular, , and parapharyngeal spaces. Associated scattered foci of gas and partial effacement of the oropharyngeal/hypopharyngeal airway. Findings are likely dental in etiology and compatible with gas-forming soft tissue infection, which can be seen with cellulitis, myonecrosis, and/or necrotizing fasciitis. Blood cultures were obtained and patient was started on empiric IV clindamycin, IV vancomycin, and Zosyn due to concerns for necrotizing fasciitis. Patient was then transferred to [facility] for surgical debridement and possible need for hyperbaric therapy.     Upon admission on 6/3, patient was mildly tachycardic, otherwise afebrile and vitals stable. Placed on a mechanical ventilator for airway protection. Labs notable include leukocytosis (WBC 18.6, ANC 15.8) and CRP (285). Lactic acid within normal limits. On 6/4, patient underwent emergent incision and drainage on the left sublingual, submandibular, and  lateral pharyngeal space abscess as well as extraction of tooth #17 with placement of 4 penrose drains (3 at left neck, 1 in mouth). Patient remained intubated postoperatively and was admitted to ICU. The patient was extubated on 6/5 to room air and remains stable. Blood cultures remain NGTD x2 days and intraoperative neck abscess cultures grew 2+ Streptococcus constellatus, anaerobic culture remains NGTD x2 days.          Active Anti-infective Medications   (From admission, onward)                 Start     Stop    06/04/24 2100  vancomycin (VANCOCIN) injection  2,000 mg,   Intravenous,   EVERY 24 HOURS        Sepsis, Skin and Soft Tissue Infection       --    06/04/24 0730  clindamycin  900 mg,   Intravenous,   100 mL/hr,   EVERY 8 HOURS        Abscess       --    06/04/24 0700  piperacillin-tazobactam  4.5 g,   Intravenous,   EVERY 6 HOURS        Abscess       --                  Assessment:    Patient presented with significant amounts of erythema, tenderness, and fluctuance on the left lateral side of the jaw and face with concerns of necrotizing fasciitis evident on CT. Necrotizing fasciitis of the head and neck can result from a breach in the oropharynx mucous membrane following surgery or instrumentation or in this case odontogenic origin. Patient has undergone I&D of all 3 sites and remains on day 4 of empiric clindamycin, vancomycin, and zosyn with stable vitals and is afebrile. WBC (14.9) and CRP (261) elevated but trending downward. Streptococcus constellatus isolated from intraoperative culture is a gram-positive streptococci that normally colonizes the oropharyngeal space and is associated with formation of abscesses. S. Constellatus is susceptible to penicillin and ampicillin thus recommend discontinuing all empiric IV antibiotics and start PO Augmentin for adequate coverage of Streptococcus constellatus as well as other oropharyngeal organisms including anaerobes. Oral antibiotics are appropriate  given patient is tolerating other oral medications and has been hemodynamically stable after source control. We defer total duration of therapy to dentist or PCP with monitoring of symptoms after discharge. Monitor intraoperative anaerobic culture to ensure no growth of Actinomyces which requires prolonged duration of therapy.     Recommendations:   Stop all empiric IV antibiotics (clindamycin, vancomycin, and Zosyn)   2.    Start PO Augmentin 875-125 mg BID   3.    We defer total duration of therapy to dentist or PCP with monitoring of symptoms after discharge    Pharmacy took the following actions:  Electronic note created, Called/paged provider.      Discussed with ID Staff:  Oral Rojas MD, M.Med.Sc., and Codie Ku, PharmD, BCIDP  Anupama ReisD Candidate [id number]    Vital Signs/Clinical Features:  Vitals         06/04 0700  06/05 0659 06/05 0700  06/06 0659 06/06 0700  06/06 1417   Most Recent      Temp ( F) 97.7 -  100.4    97.7 -  99    98.1 -  98.9     98.1 (36.7) 06/06 1352    Pulse 57 -  89    56 -  89      60     60 06/06 1352    Resp 16 -  19    11 -  21      16     16 06/06 1352    BP 88/53 -  129/93    108/61 -  160/85    141/66 -  143/73     141/66 06/06 1352    SpO2 (%) 98 -  100    95 -  100    97 -  99     97 06/06 1352            Labs  Estimated Creatinine Clearance: 84.6 mL/min (A) (based on SCr of 1.29 mg/dL (H)).  Recent Labs   Lab Test 01/20/20 2209 04/30/24 1058 06/03/24 1822 06/04/24 0629 06/05/24  0644 06/06/24  0524   CR 1.37* 1.14 1.29* 1.18* 1.22* 1.29*       Recent Labs   Lab Test 01/20/20 2209 04/30/24 1058 04/30/24 1058 06/03/24 1822 06/04/24  0629 06/05/24  0644 06/06/24  0524   WBC 8.0 5.3  --  18.6* 13.9* 14.1* 14.9*   ANEU 7.1  --   --   --   --   --   --    ALYM 0.5*  --   --   --   --   --   --    NORTH 0.3  --   --   --   --   --   --    AEOS 0.1  --   --   --   --   --   --    HGB 13.8 12.8*  --  12.4* 10.2* 9.7* 9.9*   HCT 42.2 39.7*  --  37.7* 30.9*  30.1* 30.4*   MCV 86 85  --  84 86 88 87    305   < > 271 237 286 312    < > = values in this interval not displayed.       Recent Labs   Lab Test 04/30/24  1058   BILITOTAL 0.5   ALKPHOS 74   ALBUMIN 4.3   AST 22   ALT 20       Recent Labs   Lab Test 06/03/24  1822 06/03/24  2018 06/04/24  0629 06/05/24  0644   LACT  --  1.3 0.9  --    CRPI 285.00*  --  278.00* 261.00*       Recent Labs   Lab Test 06/05/24  1818   VANCOMYCIN 11.1       Culture Results:  7-Day Micro Results       Procedure Component Value Units Date/Time    Anaerobic Bacterial Culture Routine [[id number]]  (Abnormal) Collected: 06/04/24 0222    Order Status: Completed Lab Status: Preliminary result Updated: 06/06/24 1351    Specimen: Abscess from Neck      Culture Culture in progress      3+ Fusobacterium nucleatum     Comment: Susceptibilities not routinely done, refer to antibiogram to view typical susceptibility profiles       Gram Stain [[id number]]  (Abnormal) Collected: 06/04/24 0222    Order Status: Completed Lab Status: Final result Updated: 06/04/24 0340    Specimen: Abscess from Neck      GS Culture See corresponding culture for results     Gram Stain Result 1+ Gram positive cocci      1+ WBC seen    Fungal or Yeast Culture Routine [[id number]] Collected: 06/04/24 0222    Order Status: Completed Lab Status: Preliminary result Updated: 06/06/24 0246    Specimen: Abscess from Neck      Culture No growth after 2 days    Abscess Aerobic Bacterial Culture Routine [[id number]]  (Abnormal) Collected: 06/04/24 0222    Order Status: Completed Lab Status: Final result Updated: 06/06/24 0801    Specimen: Abscess from Neck      Culture 2+ Streptococcus constellatus     Comment: This organism is susceptible to ampicillin, penicillin, vancomycin and the cephalosporins. If treatment is required and your patient is allergic to penicillin, contact the microbiology lab within 5 days to request susceptibility testing.       Narrative:       This specimen was received on a swab. Results may not be optimal. For maximum sensitivity of detection submit tissue, fluid or fine needle aspirate.    Blood Culture Line, venous [[id number]]  (Normal) Collected: 06/03/24 2031    Order Status: Completed Lab Status: Preliminary result Updated: 06/06/24 0031    Specimen: Blood from Line, venous      Culture No growth after 2 days    Blood Culture Line, venous [[id number]]  (Normal) Collected: 06/03/24 2018    Order Status: Completed Lab Status: Preliminary result Updated: 06/06/24 0031    Specimen: Blood from Line, venous      Culture No growth after 2 days                                    Imaging: US Upper Extremity Venous Duplex Left    Result Date: 6/5/2024  EXAMINATION: DOPPLER VENOUS ULTRASOUND OF THE LEFT UPPER EXTREMITY, 6/5/2024 11:08 AM COMPARISON: None. HISTORY: Unilateral left upper extremity arm swelling. Assess for central venous thrombus. TECHNIQUE:  Gray-scale evaluation with compression, spectral flow and color Doppler assessment of the deep venous system of the left upper extremity. FINDINGS: Left: Normal blood flow and waveforms are demonstrated in the internal jugular, innominate, subclavian, and axillary veins. There is normal compressibility of the brachial, basilic and cephalic veins.     IMPRESSION: No evidence of left upper extremity deep venous thrombosis. ADDISON WARE MD   SYSTEM ID:  [id number]    XR Abdomen Port 1 View    Result Date: 6/4/2024  XR ABDOMEN PORT 1 VIEW  6/4/2024 7:09 AM HISTORY:  Confirm OGT placement COMPARISON:  None. TECHNIQUE: Single supine radiograph of the abdomen. FINDINGS: Enteric tube tip terminates in the stomach with sidehole projecting over the stomach as well. No obstructive bowel gas pattern. There are no abnormal calcifications or evidence of organomegaly. Small to moderate stool burden in the colon. The visualized lung bases are clear.  No acute osseous abnormalities.     IMPRESSION: Enteric tube tip  and sidehole projects over the stomach. I have personally reviewed the examination and initial interpretation and I agree with the findings. CHEVY AYON MD   SYSTEM ID:  [id number]    XR Chest Port 1 View    Result Date: 6/4/2024  Exam: XR CHEST PORT 1 VIEW, 6/4/2024 7:09 AM Indication: confirm ETT placement Comparison: None Findings: Portable semiupright and supine AP views of the chest. Endotracheal tube tip in the mid thoracic trachea approximately 2.6 cm above the claudia. Gastric tube with tip and sidehole projecting in the stomach. Trachea is midline. Heart size is normal. Low lung volumes. No focal pulmonary opacity, pleural effusion, or pneumothorax.     Impression: Endotracheal tube tip in the lower thoracic trachea approximately 2.6 cm above the claudia. No acute airspace disease. I have personally reviewed the examination and initial interpretation and I agree with the findings. NIKOLE ADAM MD   SYSTEM ID:  [id number]    XR Chest Port 1 View    Result Date: 6/3/2024  EXAM: XR CHEST PORT 1 VIEW LOCATION: [facility] DATE: 6/3/2024 INDICATION: post intubation COMPARISON: None.     IMPRESSION: Endotracheal tube terminates one CM below the claudia in the right mainstem bronchus. Nasogastric tube in the stomach. No evidence for CHF or pneumonia. No pleural effusion or pneumothorax. Normal heart size. [Critical Result: Right mainstem bronchial intubation] Finding was identified on 6/3/2024 9:42 PM CDT. Brenda Yang R.N. was contacted by me on 6/3/2024 9:52 PM CDT and verbalized understanding of the critical result. She reports the endotracheal tube has been retracted one CM. Recommended 2-3 CM further retraction for mid tracheal positioning. The critical result will be communicated to the receiving care team at destination facility.     Soft tissue neck CT w contrast    Result Date: 6/3/2024  EXAM: CT SOFT TISSUE NECK W CONTRAST LOCATION: [facility] DATE:  6/3/2024 INDICATION: Large left sided facial swelling, likely dental abscess of left wisdom tooth. COMPARISON: None. CONTRAST: ISOVUE 370 90 mL TECHNIQUE: Routine CT Soft Tissue Neck with IV contrast. Multiplanar reformats. Dose reduction techniques were used. FINDINGS: MUCOSAL SPACES/SOFT TISSUES: Limited assessment of the floor of mouth and dentition due to streak artifact from dental restorations. Unerupted bilateral third maxillary and left mandibular molars, the latter malpositioned with surrounding osseous lucency  involving the inferior alveolar ridge. This is contiguous with moderate soft tissue induration involving the floor of mouth as well as the submandibular, parapharyngeal, and  spaces on the left where there is moderate-volume soft tissue air as  well as several small locules of fluid, the latter favored to represent phlegmonous change. Mild retropharyngeal edema and thickening. Moderate thickening of the platysma muscle on the left with overlying subcutaneous fat stranding extending along the left anterolateral neck. No discrete drainable rim-enhancing fluid collection within the neck. Mild thickening of the left aryepiglottic fold, likely reactive. Unremarkable epiglottis and infraglottic larynx. Partial effacement of the oropharyngeal and hypopharyngeal airway due to above-described soft tissue swelling with small-volume secretions, most notably in the postcricoid hypopharynx. LYMPH NODES: No pathologic lymph nodes by size or morphology criteria. SALIVARY GLANDS: Heterogeneous contrast enhancement involving the left submandibular and, to a lesser extent, parotid glands, the former with mild diffuse enlargement. Findings are presumably reactive in nature. No sialolith identified. Unremarkable right parotid and submandibular glands. THYROID: No discrete nodule or enlargement. VESSELS: Not well assessed due to technique. VISUALIZED INTRACRANIAL/ORBITS/SINUSES: Within technique limitations,  no acute abnormality of the visualized intracranial compartment or orbits. Mild polypoid left maxillary sinus mucosal thickening with multiple with multiple mucus retention cysts without an air-fluid level. No mastoid effusion. OTHER: No destructive osseous lesion. Mild to moderate multilevel cervical spondylosis. Clear visualized lungs.     IMPRESSION: 1.  Lucency surrounding the unerupted malpositioned left third mandibular molar contiguous with moderate soft tissue inflammatory changes involving the floor mouth as well as the submandibular, , and parapharyngeal spaces. Associated scattered foci of gas and partial effacement of the oropharyngeal/hypopharyngeal airway. Findings are likely dental in etiology and compatible with gas-forming soft tissue infection, which can be seen with cellulitis, myonecrosis, and/or necrotizing fasciitis. Correlate clinically to exclude Obed angina. Surgical evaluation is recommended. 2.  Mild retropharyngeal soft tissue edema and thickening without rim enhancement, most compatible with retropharyngeal cellulitis/effusion. 3.  No drainable neck abscess at this time. These findings were discussed over the phone with Dr Cody Moy on 6/3/2024 at 1955 CST.

## 2024-06-08 ENCOUNTER — PATIENT OUTREACH (OUTPATIENT)
Dept: CARE COORDINATION | Facility: CLINIC | Age: 59
End: 2024-06-08
Payer: COMMERCIAL

## 2024-06-08 LAB
BACTERIA ABSC ANAEROBE+AEROBE CULT: ABNORMAL
BACTERIA ABSC ANAEROBE+AEROBE CULT: ABNORMAL

## 2024-06-08 NOTE — PROGRESS NOTES
Clinic Care Coordination Contact  [facility]/Voicemail       Clinical Data: Care Coordinator Outreach  Outreach attempted x 2.  Left message on patient's voicemail with call back information and requested return call.  Plan:  Care Coordinator will do no further outreaches at this time.    Eva FERNANDEZ Community Health Worker  Clinic Care Coordination  [facility]  Phone: [phone]

## 2024-06-09 DIAGNOSIS — I10 PRIMARY HYPERTENSION: ICD-10-CM

## 2024-06-09 LAB
BACTERIA BLD CULT: NO GROWTH
BACTERIA BLD CULT: NO GROWTH

## 2024-06-10 RX ORDER — HYDROCHLOROTHIAZIDE 25 MG/1
25 TABLET ORAL DAILY
Qty: 90 TABLET | Refills: 0 | Status: SHIPPED | OUTPATIENT
Start: 2024-06-10 | End: 2024-09-09

## 2024-06-14 LAB
ATRIAL RATE - MUSE: 103 BPM
DIASTOLIC BLOOD PRESSURE - MUSE: 94 MMHG
INTERPRETATION ECG - MUSE: NORMAL
P AXIS - MUSE: 72 DEGREES
PR INTERVAL - MUSE: 166 MS
QRS DURATION - MUSE: 100 MS
QT - MUSE: 336 MS
QTC - MUSE: 440 MS
R AXIS - MUSE: 47 DEGREES
SYSTOLIC BLOOD PRESSURE - MUSE: 188 MMHG
T AXIS - MUSE: 81 DEGREES
VENTRICULAR RATE- MUSE: 103 BPM

## 2024-07-02 LAB — BACTERIA ABSC ANAEROBE+AEROBE CULT: NO GROWTH

## 2024-08-10 ENCOUNTER — HEALTH MAINTENANCE LETTER (OUTPATIENT)
Age: 59
End: 2024-08-10

## 2024-09-07 DIAGNOSIS — I10 PRIMARY HYPERTENSION: ICD-10-CM

## 2024-09-09 RX ORDER — HYDROCHLOROTHIAZIDE 25 MG/1
25 TABLET ORAL DAILY
Qty: 90 TABLET | Refills: 1 | Status: SHIPPED | OUTPATIENT
Start: 2024-09-09

## 2025-08-16 ENCOUNTER — HEALTH MAINTENANCE LETTER (OUTPATIENT)
Age: 60
End: 2025-08-16

## (undated) DEVICE — WIPES FOLEY CARE SURESTEP PROVON DFC100

## (undated) DEVICE — APPLICATORS COTTON TIP 6"X2 STERILE LF C15053-006

## (undated) DEVICE — LINEN TOWEL PACK X6 WHITE 5487

## (undated) DEVICE — SOL NACL 0.9% IRRIG 1000ML BOTTLE 2F7124

## (undated) DEVICE — TEST TUBE W/SCREW CAP 17361

## (undated) DEVICE — DRAIN PENROSE 1/4"X18" LATEX  30416-025

## (undated) DEVICE — SU SILK 2-0 SH 30" K833H

## (undated) DEVICE — DRSG GAUZE 4X4" TRAY 6939

## (undated) DEVICE — PACK NEURO MINOR UMMC SNE32MNMU4

## (undated) DEVICE — ESU GROUND PAD ADULT W/CORD E7507

## (undated) DEVICE — ADH LIQUID MASTISOL TOPICAL VIAL 2-3ML 0523-48

## (undated) DEVICE — TOOTHBRUSH ADULT NON STERILE MDS136850

## (undated) DEVICE — ESU NDL COLORADO MICRO E1651

## (undated) DEVICE — LINEN TOWEL PACK X5 5464

## (undated) DEVICE — SOL WATER IRRIG 1000ML BOTTLE 2F7114

## (undated) DEVICE — PREP POVIDONE-IODINE 10% SOLUTION 4OZ BOTTLE MDS093944

## (undated) DEVICE — SUCTION MANIFOLD NEPTUNE 2 SYS 4 PORT 0702-020-000

## (undated) DEVICE — BUR STRK SIDE CUT 1.6X5.1X44.8MM CARBIDE 6FLUTE 1607-002-107

## (undated) DEVICE — BRUSH SURGICAL EZ SCRUB PLAIN 371603

## (undated) DEVICE — PAD CHUX UNDERPAD 23X24" 7136

## (undated) DEVICE — SU CHROMIC 3-0 SH 27" G122H

## (undated) RX ORDER — PROPOFOL 10 MG/ML
INJECTION, EMULSION INTRAVENOUS
Status: DISPENSED
Start: 2024-06-04

## (undated) RX ORDER — PHENYLEPHRINE HCL IN 0.9% NACL 50MG/250ML
PLASTIC BAG, INJECTION (ML) INTRAVENOUS
Status: DISPENSED
Start: 2024-06-04

## (undated) RX ORDER — GLYCOPYRROLATE 0.2 MG/ML
INJECTION, SOLUTION INTRAMUSCULAR; INTRAVENOUS
Status: DISPENSED
Start: 2024-06-04

## (undated) RX ORDER — HYDROMORPHONE HYDROCHLORIDE 1 MG/ML
INJECTION, SOLUTION INTRAMUSCULAR; INTRAVENOUS; SUBCUTANEOUS
Status: DISPENSED
Start: 2024-06-04

## (undated) RX ORDER — ONDANSETRON 2 MG/ML
INJECTION INTRAMUSCULAR; INTRAVENOUS
Status: DISPENSED
Start: 2024-06-04

## (undated) RX ORDER — FENTANYL CITRATE 50 UG/ML
INJECTION, SOLUTION INTRAMUSCULAR; INTRAVENOUS
Status: DISPENSED
Start: 2024-06-04

## (undated) RX ORDER — DEXAMETHASONE SODIUM PHOSPHATE 4 MG/ML
INJECTION, SOLUTION INTRA-ARTICULAR; INTRALESIONAL; INTRAMUSCULAR; INTRAVENOUS; SOFT TISSUE
Status: DISPENSED
Start: 2024-06-04